# Patient Record
Sex: MALE | Race: ASIAN | NOT HISPANIC OR LATINO | Employment: OTHER | URBAN - METROPOLITAN AREA
[De-identification: names, ages, dates, MRNs, and addresses within clinical notes are randomized per-mention and may not be internally consistent; named-entity substitution may affect disease eponyms.]

---

## 2017-01-01 ENCOUNTER — HOSPITAL ENCOUNTER (EMERGENCY)
Facility: HOSPITAL | Age: 82
Discharge: HOME | End: 2017-08-02
Attending: EMERGENCY MEDICINE
Payer: MEDICARE

## 2017-01-01 ENCOUNTER — HOSPITAL ENCOUNTER (INPATIENT)
Facility: HOSPITAL | Age: 82
LOS: 3 days | DRG: 871 | End: 2017-09-09
Attending: EMERGENCY MEDICINE | Admitting: FAMILY MEDICINE
Payer: MEDICARE

## 2017-01-01 VITALS
OXYGEN SATURATION: 100 % | BODY MASS INDEX: 17.99 KG/M2 | HEART RATE: 80 BPM | RESPIRATION RATE: 16 BRPM | WEIGHT: 108 LBS | DIASTOLIC BLOOD PRESSURE: 86 MMHG | TEMPERATURE: 98 F | SYSTOLIC BLOOD PRESSURE: 139 MMHG | HEIGHT: 65 IN

## 2017-01-01 VITALS
WEIGHT: 95.88 LBS | SYSTOLIC BLOOD PRESSURE: 41 MMHG | BODY MASS INDEX: 18.82 KG/M2 | HEIGHT: 60 IN | DIASTOLIC BLOOD PRESSURE: 25 MMHG | RESPIRATION RATE: 41 BRPM | TEMPERATURE: 98 F | OXYGEN SATURATION: 76 %

## 2017-01-01 DIAGNOSIS — Z86.79 HX OF ACUTE BACTERIAL ENDOCARDITIS: ICD-10-CM

## 2017-01-01 DIAGNOSIS — A41.9 SEPSIS DUE TO PNEUMONIA: ICD-10-CM

## 2017-01-01 DIAGNOSIS — A41.9 SEPSIS, DUE TO UNSPECIFIED ORGANISM: ICD-10-CM

## 2017-01-01 DIAGNOSIS — N39.0 URINARY TRACT INFECTION WITH HEMATURIA, SITE UNSPECIFIED: ICD-10-CM

## 2017-01-01 DIAGNOSIS — S00.83XA FOREHEAD CONTUSION, INITIAL ENCOUNTER: Primary | ICD-10-CM

## 2017-01-01 DIAGNOSIS — E87.20 METABOLIC ACIDOSIS: ICD-10-CM

## 2017-01-01 DIAGNOSIS — J18.9 PNEUMONIA OF RIGHT UPPER LOBE DUE TO INFECTIOUS ORGANISM: Primary | ICD-10-CM

## 2017-01-01 DIAGNOSIS — J18.9 SEPSIS DUE TO PNEUMONIA: ICD-10-CM

## 2017-01-01 DIAGNOSIS — R62.7 FAILURE TO THRIVE IN ADULT: ICD-10-CM

## 2017-01-01 DIAGNOSIS — E16.2 HYPOGLYCEMIA: ICD-10-CM

## 2017-01-01 DIAGNOSIS — N17.0 ACUTE RENAL FAILURE WITH TUBULAR NECROSIS: ICD-10-CM
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
## 2017-01-01 DIAGNOSIS — R31.9 URINARY TRACT INFECTION WITH HEMATURIA, SITE UNSPECIFIED: ICD-10-CM

## 2017-01-01 DIAGNOSIS — I35.9 NONRHEUMATIC AORTIC VALVE DISORDER: ICD-10-CM

## 2017-01-01 LAB
ALBUMIN SERPL BCP-MCNC: 1.7 G/DL
ALBUMIN SERPL BCP-MCNC: 2.2 G/DL
ALBUMIN SERPL BCP-MCNC: 2.8 G/DL
ALLENS TEST: ABNORMAL
ALP SERPL-CCNC: 55 U/L
ALP SERPL-CCNC: 60 U/L
ALP SERPL-CCNC: 98 U/L
ALT SERPL W/O P-5'-P-CCNC: 38 U/L
ALT SERPL W/O P-5'-P-CCNC: 49 U/L
ALT SERPL W/O P-5'-P-CCNC: 74 U/L
ANION GAP SERPL CALC-SCNC: 13 MMOL/L
ANION GAP SERPL CALC-SCNC: 8 MMOL/L
ANION GAP SERPL CALC-SCNC: 9 MMOL/L
ANISOCYTOSIS BLD QL SMEAR: SLIGHT
ANISOCYTOSIS BLD QL SMEAR: SLIGHT
AORTIC VALVE REGURGITATION: ABNORMAL
APTT BLDCRRT: 27.9 SEC
AST SERPL-CCNC: 77 U/L
AST SERPL-CCNC: 90 U/L
AST SERPL-CCNC: 91 U/L
BACTERIA #/AREA URNS HPF: ABNORMAL /HPF
BACTERIA BLD CULT: NORMAL
BACTERIA SPEC AEROBE CULT: NORMAL
BACTERIA UR CULT: NO GROWTH
BACTERIA UR CULT: NO GROWTH
BASOPHILS # BLD AUTO: 0 K/UL
BASOPHILS # BLD AUTO: ABNORMAL K/UL
BASOPHILS # BLD AUTO: ABNORMAL K/UL
BASOPHILS NFR BLD: 0 %
BILIRUB SERPL-MCNC: 0.4 MG/DL
BILIRUB SERPL-MCNC: 0.5 MG/DL
BILIRUB SERPL-MCNC: 0.5 MG/DL
BILIRUB UR QL STRIP: NEGATIVE
BILIRUB UR QL STRIP: NEGATIVE
BNP SERPL-MCNC: 145 PG/ML
BUN SERPL-MCNC: 42 MG/DL
BUN SERPL-MCNC: 50 MG/DL
BUN SERPL-MCNC: 51 MG/DL
BURR CELLS BLD QL SMEAR: ABNORMAL
BURR CELLS BLD QL SMEAR: ABNORMAL
CALCIUM SERPL-MCNC: 7.4 MG/DL
CALCIUM SERPL-MCNC: 7.5 MG/DL
CALCIUM SERPL-MCNC: 8.6 MG/DL
CHLORIDE SERPL-SCNC: 106 MMOL/L
CHLORIDE SERPL-SCNC: 114 MMOL/L
CHLORIDE SERPL-SCNC: 114 MMOL/L
CLARITY UR: CLEAR
CLARITY UR: CLEAR
CO2 SERPL-SCNC: 13 MMOL/L
CO2 SERPL-SCNC: 16 MMOL/L
CO2 SERPL-SCNC: 17 MMOL/L
COLOR UR: ABNORMAL
COLOR UR: YELLOW
CORTIS SERPL-MCNC: 32.7 UG/DL
CREAT SERPL-MCNC: 1 MG/DL
CREAT SERPL-MCNC: 1.1 MG/DL
CREAT SERPL-MCNC: 1.5 MG/DL
DACRYOCYTES BLD QL SMEAR: ABNORMAL
DELSYS: ABNORMAL
DIASTOLIC DYSFUNCTION: YES
DIFFERENTIAL METHOD: ABNORMAL
EOSINOPHIL # BLD AUTO: 0 K/UL
EOSINOPHIL # BLD AUTO: ABNORMAL K/UL
EOSINOPHIL # BLD AUTO: ABNORMAL K/UL
EOSINOPHIL NFR BLD: 0 %
EOSINOPHIL NFR BLD: 0 %
EOSINOPHIL NFR BLD: 0.6 %
EOSINOPHIL NFR BLD: 1 %
EP: 5
ERYTHROCYTE [DISTWIDTH] IN BLOOD BY AUTOMATED COUNT: 13.8 %
ERYTHROCYTE [DISTWIDTH] IN BLOOD BY AUTOMATED COUNT: 14 %
ERYTHROCYTE [DISTWIDTH] IN BLOOD BY AUTOMATED COUNT: 14.1 %
ERYTHROCYTE [DISTWIDTH] IN BLOOD BY AUTOMATED COUNT: 14.2 %
ERYTHROCYTE [SEDIMENTATION RATE] IN BLOOD BY WESTERGREN METHOD: 24 MM/H
ERYTHROCYTE [SEDIMENTATION RATE] IN BLOOD BY WESTERGREN METHOD: 30 MM/H
ERYTHROCYTE [SEDIMENTATION RATE] IN BLOOD BY WESTERGREN METHOD: 4 MM/H
EST. GFR  (AFRICAN AMERICAN): 49 ML/MIN/1.73 M^2
EST. GFR  (AFRICAN AMERICAN): >60 ML/MIN/1.73 M^2
EST. GFR  (AFRICAN AMERICAN): >60 ML/MIN/1.73 M^2
EST. GFR  (NON AFRICAN AMERICAN): 43 ML/MIN/1.73 M^2
EST. GFR  (NON AFRICAN AMERICAN): >60 ML/MIN/1.73 M^2
EST. GFR  (NON AFRICAN AMERICAN): >60 ML/MIN/1.73 M^2
ESTIMATED PA SYSTOLIC PRESSURE: 21.49
FIO2: 100
FIO2: 60
FIO2: 80
FLUAV AG SPEC QL IA: NEGATIVE
FLUBV AG SPEC QL IA: NEGATIVE
GGT SERPL-CCNC: 29 U/L
GLOBAL PERICARDIAL EFFUSION: ABNORMAL
GLUCOSE SERPL-MCNC: 109 MG/DL (ref 70–110)
GLUCOSE SERPL-MCNC: 181 MG/DL
GLUCOSE SERPL-MCNC: 57 MG/DL
GLUCOSE SERPL-MCNC: 64 MG/DL
GLUCOSE UR QL STRIP: NEGATIVE
GLUCOSE UR QL STRIP: NEGATIVE
GRAM STN SPEC: NORMAL
HCO3 UR-SCNC: 13.2 MMOL/L (ref 24–28)
HCO3 UR-SCNC: 13.7 MMOL/L (ref 24–28)
HCO3 UR-SCNC: 14.1 MMOL/L (ref 24–28)
HCO3 UR-SCNC: 15.2 MMOL/L (ref 24–28)
HCO3 UR-SCNC: 18.8 MMOL/L (ref 24–28)
HCT VFR BLD AUTO: 27.9 %
HCT VFR BLD AUTO: 31.1 %
HCT VFR BLD AUTO: 31.5 %
HCT VFR BLD AUTO: 33.7 %
HGB BLD-MCNC: 10.2 G/DL
HGB BLD-MCNC: 10.3 G/DL
HGB BLD-MCNC: 11 G/DL
HGB BLD-MCNC: 9.5 G/DL
HGB UR QL STRIP: ABNORMAL
HGB UR QL STRIP: NEGATIVE
HYALINE CASTS #/AREA URNS LPF: 0 /LPF
HYPOCHROMIA BLD QL SMEAR: ABNORMAL
HYPOCHROMIA BLD QL SMEAR: ABNORMAL
INR PPP: 1
IP: 10
KETONES UR QL STRIP: ABNORMAL
KETONES UR QL STRIP: NEGATIVE
LACTATE SERPL-SCNC: 2.8 MMOL/L
LACTATE SERPL-SCNC: 2.8 MMOL/L
LACTATE SERPL-SCNC: 3.2 MMOL/L
LACTATE SERPL-SCNC: 3.3 MMOL/L
LACTATE SERPL-SCNC: 3.3 MMOL/L
LACTATE SERPL-SCNC: 3.5 MMOL/L
LACTATE SERPL-SCNC: 3.6 MMOL/L
LACTATE SERPL-SCNC: 3.7 MMOL/L
LACTATE SERPL-SCNC: 3.8 MMOL/L
LACTATE SERPL-SCNC: 4.1 MMOL/L
LACTATE SERPL-SCNC: 4.4 MMOL/L
LACTATE SERPL-SCNC: 4.5 MMOL/L
LACTATE SERPL-SCNC: 4.6 MMOL/L
LACTATE SERPL-SCNC: 4.7 MMOL/L
LEUKOCYTE ESTERASE UR QL STRIP: ABNORMAL
LEUKOCYTE ESTERASE UR QL STRIP: NEGATIVE
LIPASE SERPL-CCNC: 120 U/L
LYMPHOCYTES # BLD AUTO: 0.7 K/UL
LYMPHOCYTES # BLD AUTO: ABNORMAL K/UL
LYMPHOCYTES # BLD AUTO: ABNORMAL K/UL
LYMPHOCYTES NFR BLD: 3 %
LYMPHOCYTES NFR BLD: 3 %
LYMPHOCYTES NFR BLD: 40.4 %
LYMPHOCYTES NFR BLD: 8 %
MAGNESIUM SERPL-MCNC: 1.5 MG/DL
MAGNESIUM SERPL-MCNC: 1.9 MG/DL
MAGNESIUM SERPL-MCNC: 2 MG/DL
MCH RBC QN AUTO: 29.9 PG
MCH RBC QN AUTO: 29.9 PG
MCH RBC QN AUTO: 30 PG
MCH RBC QN AUTO: 31 PG
MCHC RBC AUTO-ENTMCNC: 32.6 G/DL
MCHC RBC AUTO-ENTMCNC: 32.7 G/DL
MCHC RBC AUTO-ENTMCNC: 32.8 G/DL
MCHC RBC AUTO-ENTMCNC: 34.1 G/DL
MCV RBC AUTO: 91 FL
MCV RBC AUTO: 91 FL
MCV RBC AUTO: 92 FL
MCV RBC AUTO: 92 FL
METAMYELOCYTES NFR BLD MANUAL: 12 %
METAMYELOCYTES NFR BLD MANUAL: 7 %
MICROSCOPIC COMMENT: ABNORMAL
MIN VOL: 10.6
MIN VOL: 15
MIN VOL: 30
MODE: ABNORMAL
MONOCYTES # BLD AUTO: 0 K/UL
MONOCYTES # BLD AUTO: ABNORMAL K/UL
MONOCYTES # BLD AUTO: ABNORMAL K/UL
MONOCYTES NFR BLD: 0 %
MONOCYTES NFR BLD: 1.2 %
MONOCYTES NFR BLD: 2 %
MONOCYTES NFR BLD: 2 %
MYELOCYTES NFR BLD MANUAL: 2 %
NEUTROPHILS # BLD AUTO: 0.9 K/UL
NEUTROPHILS NFR BLD: 18 %
NEUTROPHILS NFR BLD: 43 %
NEUTROPHILS NFR BLD: 57.8 %
NEUTROPHILS NFR BLD: 69 %
NEUTS BAND NFR BLD MANUAL: 20 %
NEUTS BAND NFR BLD MANUAL: 47 %
NEUTS BAND NFR BLD MANUAL: 63 %
NITRITE UR QL STRIP: NEGATIVE
NITRITE UR QL STRIP: POSITIVE
PATH REV BLD -IMP: NORMAL
PCO2 BLDA: 23.3 MMHG (ref 35–45)
PCO2 BLDA: 29.5 MMHG (ref 35–45)
PCO2 BLDA: 30.7 MMHG (ref 35–45)
PCO2 BLDA: 37.6 MMHG (ref 35–45)
PCO2 BLDA: 45.7 MMHG (ref 35–45)
PEEP: 14
PEEP: 8
PH SMN: 7.07 [PH] (ref 7.35–7.45)
PH SMN: 7.21 [PH] (ref 7.35–7.45)
PH SMN: 7.26 [PH] (ref 7.35–7.45)
PH SMN: 7.39 [PH] (ref 7.35–7.45)
PH SMN: 7.41 [PH] (ref 7.35–7.45)
PH UR STRIP: 6 [PH] (ref 5–8)
PH UR STRIP: 6 [PH] (ref 5–8)
PHOSPHATE SERPL-MCNC: 2.7 MG/DL
PHOSPHATE SERPL-MCNC: 2.9 MG/DL
PHOSPHATE SERPL-MCNC: 2.9 MG/DL
PIP: 25
PIP: 34
PLATELET # BLD AUTO: 126 K/UL
PLATELET # BLD AUTO: 155 K/UL
PLATELET # BLD AUTO: 169 K/UL
PLATELET # BLD AUTO: 267 K/UL
PLATELET BLD QL SMEAR: ABNORMAL
PMV BLD AUTO: 10.1 FL
PMV BLD AUTO: 9.1 FL
PMV BLD AUTO: 9.2 FL
PMV BLD AUTO: 9.3 FL
PO2 BLDA: 151 MMHG (ref 80–100)
PO2 BLDA: 20 MMHG (ref 40–60)
PO2 BLDA: 284 MMHG (ref 80–100)
PO2 BLDA: 56 MMHG (ref 80–100)
PO2 BLDA: 56 MMHG (ref 80–100)
POC BE: -11 MMOL/L
POC BE: -13 MMOL/L
POC BE: -13 MMOL/L
POC BE: -17 MMOL/L
POC BE: -6 MMOL/L
POC SATURATED O2: 100 % (ref 95–100)
POC SATURATED O2: 24 % (ref 95–100)
POC SATURATED O2: 76 % (ref 95–100)
POC SATURATED O2: 84 % (ref 95–100)
POC SATURATED O2: 99 % (ref 95–100)
POC TCO2: 15 MMOL/L (ref 23–27)
POC TCO2: 16 MMOL/L (ref 24–29)
POC TCO2: 20 MMOL/L (ref 23–27)
POCT GLUCOSE: 128 MG/DL (ref 70–110)
POCT GLUCOSE: 146 MG/DL (ref 70–110)
POCT GLUCOSE: 147 MG/DL (ref 70–110)
POCT GLUCOSE: 47 MG/DL (ref 70–110)
POCT GLUCOSE: 49 MG/DL (ref 70–110)
POCT GLUCOSE: 59 MG/DL (ref 70–110)
POCT GLUCOSE: 62 MG/DL (ref 70–110)
POCT GLUCOSE: 63 MG/DL (ref 70–110)
POCT GLUCOSE: 64 MG/DL (ref 70–110)
POCT GLUCOSE: 67 MG/DL (ref 70–110)
POCT GLUCOSE: 69 MG/DL (ref 70–110)
POCT GLUCOSE: 74 MG/DL (ref 70–110)
POCT GLUCOSE: 84 MG/DL (ref 70–110)
POCT GLUCOSE: 85 MG/DL (ref 70–110)
POCT GLUCOSE: 85 MG/DL (ref 70–110)
POCT GLUCOSE: 92 MG/DL (ref 70–110)
POCT GLUCOSE: 99 MG/DL (ref 70–110)
POIKILOCYTOSIS BLD QL SMEAR: SLIGHT
POIKILOCYTOSIS BLD QL SMEAR: SLIGHT
POTASSIUM SERPL-SCNC: 4.2 MMOL/L
POTASSIUM SERPL-SCNC: 4.5 MMOL/L
POTASSIUM SERPL-SCNC: 4.5 MMOL/L
PROCALCITONIN SERPL IA-MCNC: 0.38 NG/ML
PROCALCITONIN SERPL IA-MCNC: 39.29 NG/ML
PROT SERPL-MCNC: 4.6 G/DL
PROT SERPL-MCNC: 5.9 G/DL
PROT SERPL-MCNC: 7.3 G/DL
PROT UR QL STRIP: ABNORMAL
PROT UR QL STRIP: ABNORMAL
PROTHROMBIN TIME: 11 SEC
RBC # BLD AUTO: 3.06 M/UL
RBC # BLD AUTO: 3.4 M/UL
RBC # BLD AUTO: 3.45 M/UL
RBC # BLD AUTO: 3.68 M/UL
RBC #/AREA URNS HPF: >100 /HPF (ref 0–4)
RETIRED EF AND QEF - SEE NOTES: 60 (ref 55–65)
SAMPLE: ABNORMAL
SITE: ABNORMAL
SODIUM SERPL-SCNC: 135 MMOL/L
SODIUM SERPL-SCNC: 136 MMOL/L
SODIUM SERPL-SCNC: 139 MMOL/L
SP GR UR STRIP: 1.01 (ref 1–1.03)
SP GR UR STRIP: 1.02 (ref 1–1.03)
SP02: 76
SP02: 92
SP02: 95
SP02: 97
SPECIMEN SOURCE: NORMAL
SPONT RATE: 33
SPONT RATE: 34
SQUAMOUS #/AREA URNS HPF: 2 /HPF
T4 FREE SERPL-MCNC: 1.14 NG/DL
TRICUSPID VALVE REGURGITATION: ABNORMAL
TROPONIN I SERPL DL<=0.01 NG/ML-MCNC: 0.01 NG/ML
TSH SERPL DL<=0.005 MIU/L-ACNC: 3.1 UIU/ML
TSH SERPL DL<=0.005 MIU/L-ACNC: 4.22 UIU/ML
URN SPEC COLLECT METH UR: ABNORMAL
URN SPEC COLLECT METH UR: ABNORMAL
UROBILINOGEN UR STRIP-ACNC: 1 EU/DL
UROBILINOGEN UR STRIP-ACNC: 1 EU/DL
VANCOMYCIN TROUGH SERPL-MCNC: 11.4 UG/ML
VT: 330
VT: 330
WBC # BLD AUTO: 1.61 K/UL
WBC # BLD AUTO: 11.43 K/UL
WBC # BLD AUTO: 16.31 K/UL
WBC # BLD AUTO: 16.69 K/UL
WBC #/AREA URNS HPF: 2 /HPF (ref 0–5)
WBC CLUMPS URNS QL MICRO: ABNORMAL
YEAST URNS QL MICRO: ABNORMAL

## 2017-01-01 PROCEDURE — 87040 BLOOD CULTURE FOR BACTERIA: CPT | Mod: 59

## 2017-01-01 PROCEDURE — 85007 BL SMEAR W/DIFF WBC COUNT: CPT

## 2017-01-01 PROCEDURE — 63600175 PHARM REV CODE 636 W HCPCS: Performed by: INTERNAL MEDICINE

## 2017-01-01 PROCEDURE — 25000003 PHARM REV CODE 250: Performed by: STUDENT IN AN ORGANIZED HEALTH CARE EDUCATION/TRAINING PROGRAM

## 2017-01-01 PROCEDURE — 83605 ASSAY OF LACTIC ACID: CPT | Mod: 91

## 2017-01-01 PROCEDURE — 84443 ASSAY THYROID STIM HORMONE: CPT

## 2017-01-01 PROCEDURE — 84100 ASSAY OF PHOSPHORUS: CPT

## 2017-01-01 PROCEDURE — 94660 CPAP INITIATION&MGMT: CPT

## 2017-01-01 PROCEDURE — 20000000 HC ICU ROOM

## 2017-01-01 PROCEDURE — 63600175 PHARM REV CODE 636 W HCPCS: Performed by: STUDENT IN AN ORGANIZED HEALTH CARE EDUCATION/TRAINING PROGRAM

## 2017-01-01 PROCEDURE — 85027 COMPLETE CBC AUTOMATED: CPT

## 2017-01-01 PROCEDURE — 36415 COLL VENOUS BLD VENIPUNCTURE: CPT

## 2017-01-01 PROCEDURE — 0BH18EZ INSERTION OF ENDOTRACHEAL AIRWAY INTO TRACHEA, VIA NATURAL OR ARTIFICIAL OPENING ENDOSCOPIC: ICD-10-PCS | Performed by: INTERNAL MEDICINE

## 2017-01-01 PROCEDURE — 94761 N-INVAS EAR/PLS OXIMETRY MLT: CPT

## 2017-01-01 PROCEDURE — 99291 CRITICAL CARE FIRST HOUR: CPT | Mod: 25,GC,, | Performed by: INTERNAL MEDICINE

## 2017-01-01 PROCEDURE — 99285 EMERGENCY DEPT VISIT HI MDM: CPT | Mod: 25

## 2017-01-01 PROCEDURE — 25000003 PHARM REV CODE 250

## 2017-01-01 PROCEDURE — S0028 INJECTION, FAMOTIDINE, 20 MG: HCPCS | Performed by: STUDENT IN AN ORGANIZED HEALTH CARE EDUCATION/TRAINING PROGRAM

## 2017-01-01 PROCEDURE — 96367 TX/PROPH/DG ADDL SEQ IV INF: CPT

## 2017-01-01 PROCEDURE — 80053 COMPREHEN METABOLIC PANEL: CPT

## 2017-01-01 PROCEDURE — 63600175 PHARM REV CODE 636 W HCPCS: Performed by: FAMILY MEDICINE

## 2017-01-01 PROCEDURE — 84484 ASSAY OF TROPONIN QUANT: CPT

## 2017-01-01 PROCEDURE — 83690 ASSAY OF LIPASE: CPT

## 2017-01-01 PROCEDURE — 82533 TOTAL CORTISOL: CPT

## 2017-01-01 PROCEDURE — 36600 WITHDRAWAL OF ARTERIAL BLOOD: CPT

## 2017-01-01 PROCEDURE — 85060 BLOOD SMEAR INTERPRETATION: CPT | Mod: ,,, | Performed by: PATHOLOGY

## 2017-01-01 PROCEDURE — 99285 EMERGENCY DEPT VISIT HI MDM: CPT

## 2017-01-01 PROCEDURE — 81003 URINALYSIS AUTO W/O SCOPE: CPT

## 2017-01-01 PROCEDURE — 93005 ELECTROCARDIOGRAM TRACING: CPT

## 2017-01-01 PROCEDURE — 25000003 PHARM REV CODE 250: Performed by: EMERGENCY MEDICINE

## 2017-01-01 PROCEDURE — 84145 PROCALCITONIN (PCT): CPT

## 2017-01-01 PROCEDURE — 31500 INSERT EMERGENCY AIRWAY: CPT | Mod: GC,,, | Performed by: INTERNAL MEDICINE

## 2017-01-01 PROCEDURE — 82977 ASSAY OF GGT: CPT

## 2017-01-01 PROCEDURE — 27000190 HC CPAP FULL FACE MASK W/VALVE

## 2017-01-01 PROCEDURE — 84439 ASSAY OF FREE THYROXINE: CPT

## 2017-01-01 PROCEDURE — 82803 BLOOD GASES ANY COMBINATION: CPT

## 2017-01-01 PROCEDURE — 81000 URINALYSIS NONAUTO W/SCOPE: CPT

## 2017-01-01 PROCEDURE — 25000003 PHARM REV CODE 250: Performed by: FAMILY MEDICINE

## 2017-01-01 PROCEDURE — 63600175 PHARM REV CODE 636 W HCPCS: Performed by: EMERGENCY MEDICINE

## 2017-01-01 PROCEDURE — 87186 SC STD MICRODIL/AGAR DIL: CPT

## 2017-01-01 PROCEDURE — 27000221 HC OXYGEN, UP TO 24 HOURS

## 2017-01-01 PROCEDURE — 94002 VENT MGMT INPAT INIT DAY: CPT

## 2017-01-01 PROCEDURE — C9113 INJ PANTOPRAZOLE SODIUM, VIA: HCPCS | Performed by: STUDENT IN AN ORGANIZED HEALTH CARE EDUCATION/TRAINING PROGRAM

## 2017-01-01 PROCEDURE — 99291 CRITICAL CARE FIRST HOUR: CPT | Mod: GC,,, | Performed by: INTERNAL MEDICINE

## 2017-01-01 PROCEDURE — 63600175 PHARM REV CODE 636 W HCPCS

## 2017-01-01 PROCEDURE — 94003 VENT MGMT INPAT SUBQ DAY: CPT

## 2017-01-01 PROCEDURE — 83880 ASSAY OF NATRIURETIC PEPTIDE: CPT

## 2017-01-01 PROCEDURE — 83605 ASSAY OF LACTIC ACID: CPT

## 2017-01-01 PROCEDURE — 97802 MEDICAL NUTRITION INDIV IN: CPT

## 2017-01-01 PROCEDURE — 25000003 PHARM REV CODE 250: Performed by: INTERNAL MEDICINE

## 2017-01-01 PROCEDURE — 96361 HYDRATE IV INFUSION ADD-ON: CPT

## 2017-01-01 PROCEDURE — 96365 THER/PROPH/DIAG IV INF INIT: CPT

## 2017-01-01 PROCEDURE — 97803 MED NUTRITION INDIV SUBSEQ: CPT

## 2017-01-01 PROCEDURE — 96366 THER/PROPH/DIAG IV INF ADDON: CPT

## 2017-01-01 PROCEDURE — 99900035 HC TECH TIME PER 15 MIN (STAT)

## 2017-01-01 PROCEDURE — 99222 1ST HOSP IP/OBS MODERATE 55: CPT | Mod: GC,,, | Performed by: INTERNAL MEDICINE

## 2017-01-01 PROCEDURE — 83735 ASSAY OF MAGNESIUM: CPT

## 2017-01-01 PROCEDURE — 5A1945Z RESPIRATORY VENTILATION, 24-96 CONSECUTIVE HOURS: ICD-10-PCS | Performed by: INTERNAL MEDICINE

## 2017-01-01 PROCEDURE — 87400 INFLUENZA A/B EACH AG IA: CPT | Mod: 59

## 2017-01-01 PROCEDURE — 87086 URINE CULTURE/COLONY COUNT: CPT

## 2017-01-01 PROCEDURE — 85730 THROMBOPLASTIN TIME PARTIAL: CPT

## 2017-01-01 PROCEDURE — 31720 CLEARANCE OF AIRWAYS: CPT

## 2017-01-01 PROCEDURE — 93306 TTE W/DOPPLER COMPLETE: CPT

## 2017-01-01 PROCEDURE — 87070 CULTURE OTHR SPECIMN AEROBIC: CPT

## 2017-01-01 PROCEDURE — 84443 ASSAY THYROID STIM HORMONE: CPT | Mod: 91

## 2017-01-01 PROCEDURE — 93306 TTE W/DOPPLER COMPLETE: CPT | Mod: 26,,, | Performed by: INTERNAL MEDICINE

## 2017-01-01 PROCEDURE — 93010 ELECTROCARDIOGRAM REPORT: CPT | Mod: ,,, | Performed by: INTERNAL MEDICINE

## 2017-01-01 PROCEDURE — 85610 PROTHROMBIN TIME: CPT

## 2017-01-01 PROCEDURE — 87449 NOS EACH ORGANISM AG IA: CPT

## 2017-01-01 PROCEDURE — S5010 5% DEXTROSE AND 0.45% SALINE: HCPCS

## 2017-01-01 PROCEDURE — 87205 SMEAR GRAM STAIN: CPT

## 2017-01-01 PROCEDURE — 87077 CULTURE AEROBIC IDENTIFY: CPT

## 2017-01-01 PROCEDURE — 80202 ASSAY OF VANCOMYCIN: CPT

## 2017-01-01 RX ORDER — FAMOTIDINE 10 MG/ML
20 INJECTION INTRAVENOUS 2 TIMES DAILY
Status: DISCONTINUED | OUTPATIENT
Start: 2017-01-01 | End: 2017-01-01

## 2017-01-01 RX ORDER — FUROSEMIDE 10 MG/ML
40 INJECTION INTRAMUSCULAR; INTRAVENOUS ONCE
Status: COMPLETED | OUTPATIENT
Start: 2017-01-01 | End: 2017-01-01

## 2017-01-01 RX ORDER — ONDANSETRON 8 MG/1
8 TABLET, ORALLY DISINTEGRATING ORAL EVERY 8 HOURS PRN
Status: DISCONTINUED | OUTPATIENT
Start: 2017-01-01 | End: 2017-01-01 | Stop reason: HOSPADM

## 2017-01-01 RX ORDER — MORPHINE SULFATE 2 MG/ML
0.5 INJECTION, SOLUTION INTRAMUSCULAR; INTRAVENOUS ONCE
Status: COMPLETED | OUTPATIENT
Start: 2017-01-01 | End: 2017-01-01

## 2017-01-01 RX ORDER — PHENYLEPHRINE HYDROCHLORIDE 10 MG/ML
INJECTION INTRAVENOUS
Status: DISPENSED
Start: 2017-01-01 | End: 2017-01-01

## 2017-01-01 RX ORDER — CEFEPIME HYDROCHLORIDE 1 G/50ML
1 INJECTION, SOLUTION INTRAVENOUS
Status: DISCONTINUED | OUTPATIENT
Start: 2017-01-01 | End: 2017-01-01

## 2017-01-01 RX ORDER — SODIUM CHLORIDE 9 MG/ML
INJECTION, SOLUTION INTRAVENOUS CONTINUOUS
Status: DISCONTINUED | OUTPATIENT
Start: 2017-01-01 | End: 2017-01-01

## 2017-01-01 RX ORDER — LOSARTAN POTASSIUM 50 MG/1
50 TABLET ORAL DAILY
Status: DISCONTINUED | OUTPATIENT
Start: 2017-01-01 | End: 2017-01-01

## 2017-01-01 RX ORDER — CIPROFLOXACIN 2 MG/ML
400 INJECTION, SOLUTION INTRAVENOUS
Status: DISCONTINUED | OUTPATIENT
Start: 2017-01-01 | End: 2017-01-01

## 2017-01-01 RX ORDER — INSULIN ASPART 100 [IU]/ML
0-5 INJECTION, SOLUTION INTRAVENOUS; SUBCUTANEOUS EVERY 6 HOURS PRN
Status: DISCONTINUED | OUTPATIENT
Start: 2017-01-01 | End: 2017-01-01 | Stop reason: HOSPADM

## 2017-01-01 RX ORDER — SUCCINYLCHOLINE CHLORIDE 20 MG/ML
60 INJECTION INTRAMUSCULAR; INTRAVENOUS ONCE
Status: COMPLETED | OUTPATIENT
Start: 2017-01-01 | End: 2017-01-01

## 2017-01-01 RX ORDER — CEFEPIME HYDROCHLORIDE 2 G/50ML
2 INJECTION, SOLUTION INTRAVENOUS
Status: DISCONTINUED | OUTPATIENT
Start: 2017-01-01 | End: 2017-01-01

## 2017-01-01 RX ORDER — NAPROXEN SODIUM 220 MG/1
81 TABLET, FILM COATED ORAL DAILY
Status: DISCONTINUED | OUTPATIENT
Start: 2017-01-01 | End: 2017-01-01

## 2017-01-01 RX ORDER — BISACODYL 10 MG
10 SUPPOSITORY, RECTAL RECTAL ONCE
Status: COMPLETED | OUTPATIENT
Start: 2017-01-01 | End: 2017-01-01

## 2017-01-01 RX ORDER — DEXTROSE MONOHYDRATE, SODIUM CHLORIDE, AND POTASSIUM CHLORIDE 50; .745; 4.5 G/1000ML; G/1000ML; G/1000ML
INJECTION, SOLUTION INTRAVENOUS CONTINUOUS
Status: DISCONTINUED | OUTPATIENT
Start: 2017-01-01 | End: 2017-01-01

## 2017-01-01 RX ORDER — FENTANYL CITRATE 50 UG/ML
50 INJECTION, SOLUTION INTRAMUSCULAR; INTRAVENOUS ONCE
Status: DISCONTINUED | OUTPATIENT
Start: 2017-01-01 | End: 2017-01-01 | Stop reason: HOSPADM

## 2017-01-01 RX ORDER — ENOXAPARIN SODIUM 100 MG/ML
40 INJECTION SUBCUTANEOUS EVERY 24 HOURS
Status: DISCONTINUED | OUTPATIENT
Start: 2017-01-01 | End: 2017-01-01

## 2017-01-01 RX ORDER — CYANOCOBALAMIN (VITAMIN B-12) 250 MCG
500 TABLET ORAL DAILY
Status: DISCONTINUED | OUTPATIENT
Start: 2017-01-01 | End: 2017-01-01

## 2017-01-01 RX ORDER — GLUCAGON 1 MG
1 KIT INJECTION
Status: DISCONTINUED | OUTPATIENT
Start: 2017-01-01 | End: 2017-01-01

## 2017-01-01 RX ORDER — PROPOFOL 10 MG/ML
15 VIAL (ML) INTRAVENOUS ONCE
Status: COMPLETED | OUTPATIENT
Start: 2017-01-01 | End: 2017-01-01

## 2017-01-01 RX ORDER — CLOPIDOGREL BISULFATE 75 MG/1
75 TABLET ORAL DAILY
Status: DISCONTINUED | OUTPATIENT
Start: 2017-01-01 | End: 2017-01-01

## 2017-01-01 RX ORDER — ACETAMINOPHEN 650 MG/1
650 SUPPOSITORY RECTAL ONCE
Status: COMPLETED | OUTPATIENT
Start: 2017-01-01 | End: 2017-01-01

## 2017-01-01 RX ORDER — CEFEPIME HYDROCHLORIDE 2 G/50ML
2 INJECTION, SOLUTION INTRAVENOUS
Status: COMPLETED | OUTPATIENT
Start: 2017-01-01 | End: 2017-01-01

## 2017-01-01 RX ORDER — MORPHINE SULFATE 2 MG/ML
1 INJECTION, SOLUTION INTRAMUSCULAR; INTRAVENOUS ONCE
Status: COMPLETED | OUTPATIENT
Start: 2017-01-01 | End: 2017-01-01

## 2017-01-01 RX ORDER — CHLORHEXIDINE GLUCONATE ORAL RINSE 1.2 MG/ML
15 SOLUTION DENTAL 2 TIMES DAILY
Status: DISCONTINUED | OUTPATIENT
Start: 2017-01-01 | End: 2017-01-01 | Stop reason: HOSPADM

## 2017-01-01 RX ORDER — DEXTROSE MONOHYDRATE AND SODIUM CHLORIDE 5; .45 G/100ML; G/100ML
INJECTION, SOLUTION INTRAVENOUS CONTINUOUS
Status: DISCONTINUED | OUTPATIENT
Start: 2017-01-01 | End: 2017-01-01

## 2017-01-01 RX ORDER — MAGNESIUM SULFATE HEPTAHYDRATE 40 MG/ML
2 INJECTION, SOLUTION INTRAVENOUS ONCE
Status: COMPLETED | OUTPATIENT
Start: 2017-01-01 | End: 2017-01-01

## 2017-01-01 RX ORDER — PANTOPRAZOLE SODIUM 40 MG/10ML
40 INJECTION, POWDER, LYOPHILIZED, FOR SOLUTION INTRAVENOUS DAILY
Status: DISCONTINUED | OUTPATIENT
Start: 2017-01-01 | End: 2017-01-01 | Stop reason: HOSPADM

## 2017-01-01 RX ORDER — TAMSULOSIN HYDROCHLORIDE 0.4 MG/1
0.4 CAPSULE ORAL DAILY
Status: DISCONTINUED | OUTPATIENT
Start: 2017-01-01 | End: 2017-01-01

## 2017-01-01 RX ORDER — ETOMIDATE 2 MG/ML
12 INJECTION INTRAVENOUS ONCE
Status: COMPLETED | OUTPATIENT
Start: 2017-01-01 | End: 2017-01-01

## 2017-01-01 RX ADMIN — FUROSEMIDE 40 MG: 10 INJECTION, SOLUTION INTRAMUSCULAR; INTRAVENOUS at 03:09

## 2017-01-01 RX ADMIN — CIPROFLOXACIN 400 MG: 2 INJECTION, SOLUTION INTRAVENOUS at 04:09

## 2017-01-01 RX ADMIN — DEXTROSE MONOHYDRATE 12.5 G: 25 INJECTION, SOLUTION INTRAVENOUS at 12:09

## 2017-01-01 RX ADMIN — MORPHINE SULFATE 1 MG: 2 INJECTION, SOLUTION INTRAMUSCULAR; INTRAVENOUS at 08:09

## 2017-01-01 RX ADMIN — MORPHINE SULFATE 0.5 MG: 2 INJECTION, SOLUTION INTRAMUSCULAR; INTRAVENOUS at 11:09

## 2017-01-01 RX ADMIN — PIPERACILLIN SODIUM, TAZOBACTAM SODIUM 4.5 G: 4; .5 INJECTION, POWDER, LYOPHILIZED, FOR SOLUTION INTRAVENOUS at 12:09

## 2017-01-01 RX ADMIN — VANCOMYCIN HYDROCHLORIDE 750 MG: 750 INJECTION, POWDER, LYOPHILIZED, FOR SOLUTION INTRAVENOUS at 03:09

## 2017-01-01 RX ADMIN — SODIUM CHLORIDE 1000 ML: 0.9 INJECTION, SOLUTION INTRAVENOUS at 10:09

## 2017-01-01 RX ADMIN — DEXTROSE MONOHYDRATE 12.5 G: 25 INJECTION, SOLUTION INTRAVENOUS at 05:09

## 2017-01-01 RX ADMIN — CIPROFLOXACIN 400 MG: 2 INJECTION, SOLUTION INTRAVENOUS at 05:09

## 2017-01-01 RX ADMIN — CEFEPIME HYDROCHLORIDE 1 G: 1 INJECTION, SOLUTION INTRAVENOUS at 02:09

## 2017-01-01 RX ADMIN — DEXTROSE MONOHYDRATE 12.5 G: 25 INJECTION, SOLUTION INTRAVENOUS at 11:09

## 2017-01-01 RX ADMIN — FAMOTIDINE 20 MG: 10 INJECTION INTRAVENOUS at 09:09

## 2017-01-01 RX ADMIN — CLOPIDOGREL BISULFATE 75 MG: 75 TABLET ORAL at 10:09

## 2017-01-01 RX ADMIN — PIPERACILLIN SODIUM, TAZOBACTAM SODIUM 4.5 G: 4; .5 INJECTION, POWDER, LYOPHILIZED, FOR SOLUTION INTRAVENOUS at 01:09

## 2017-01-01 RX ADMIN — SODIUM CHLORIDE: 0.9 INJECTION, SOLUTION INTRAVENOUS at 04:09

## 2017-01-01 RX ADMIN — SODIUM CHLORIDE 1000 ML: 0.9 INJECTION, SOLUTION INTRAVENOUS at 12:09

## 2017-01-01 RX ADMIN — PANTOPRAZOLE SODIUM 40 MG: 40 INJECTION, POWDER, FOR SOLUTION INTRAVENOUS at 09:09

## 2017-01-01 RX ADMIN — SODIUM CHLORIDE 1470 ML: 0.9 INJECTION, SOLUTION INTRAVENOUS at 01:09

## 2017-01-01 RX ADMIN — CEFEPIME HYDROCHLORIDE 2 G: 2 INJECTION, SOLUTION INTRAVENOUS at 02:09

## 2017-01-01 RX ADMIN — PANTOPRAZOLE SODIUM 40 MG: 40 INJECTION, POWDER, FOR SOLUTION INTRAVENOUS at 10:09

## 2017-01-01 RX ADMIN — SODIUM CHLORIDE 250 ML: 0.9 INJECTION, SOLUTION INTRAVENOUS at 08:09

## 2017-01-01 RX ADMIN — CHLORHEXIDINE GLUCONATE 15 ML: 1.2 RINSE ORAL at 09:09

## 2017-01-01 RX ADMIN — FENTANYL CITRATE: 50 INJECTION, SOLUTION INTRAMUSCULAR; INTRAVENOUS at 06:09

## 2017-01-01 RX ADMIN — MAGNESIUM SULFATE IN WATER 2 G: 40 INJECTION, SOLUTION INTRAVENOUS at 08:09

## 2017-01-01 RX ADMIN — DEXTROSE MONOHYDRATE 12.5 G: 25 INJECTION, SOLUTION INTRAVENOUS at 09:09

## 2017-01-01 RX ADMIN — PANTOPRAZOLE SODIUM 40 MG: 40 INJECTION, POWDER, FOR SOLUTION INTRAVENOUS at 08:09

## 2017-01-01 RX ADMIN — CHLORHEXIDINE GLUCONATE 15 ML: 1.2 RINSE ORAL at 10:09

## 2017-01-01 RX ADMIN — SUCCINYLCHOLINE CHLORIDE 60 MG: 20 INJECTION, SOLUTION INTRAMUSCULAR; INTRAVENOUS at 01:09

## 2017-01-01 RX ADMIN — TAMSULOSIN HYDROCHLORIDE 0.4 MG: 0.4 CAPSULE ORAL at 10:09

## 2017-01-01 RX ADMIN — ACETAMINOPHEN 650 MG: 650 SUPPOSITORY RECTAL at 09:09

## 2017-01-01 RX ADMIN — VANCOMYCIN HYDROCHLORIDE 1000 MG: 1 INJECTION, POWDER, LYOPHILIZED, FOR SOLUTION INTRAVENOUS at 02:09

## 2017-01-01 RX ADMIN — MORPHINE SULFATE 0.5 MG: 2 INJECTION, SOLUTION INTRAMUSCULAR; INTRAVENOUS at 03:09

## 2017-01-01 RX ADMIN — FENTANYL CITRATE 25 MCG/HR: 50 INJECTION, SOLUTION INTRAMUSCULAR; INTRAVENOUS at 02:09

## 2017-01-01 RX ADMIN — BISACODYL 10 MG: 10 SUPPOSITORY RECTAL at 03:09

## 2017-01-01 RX ADMIN — SODIUM CHLORIDE: 0.9 INJECTION, SOLUTION INTRAVENOUS at 11:09

## 2017-01-01 RX ADMIN — FAMOTIDINE 20 MG: 10 INJECTION INTRAVENOUS at 10:09

## 2017-01-01 RX ADMIN — PIPERACILLIN SODIUM, TAZOBACTAM SODIUM 4.5 G: 4; .5 INJECTION, POWDER, LYOPHILIZED, FOR SOLUTION INTRAVENOUS at 05:09

## 2017-01-01 RX ADMIN — CEFEPIME HYDROCHLORIDE 1 G: 1 INJECTION, SOLUTION INTRAVENOUS at 03:09

## 2017-01-01 RX ADMIN — ENOXAPARIN SODIUM 40 MG: 100 INJECTION SUBCUTANEOUS at 04:09

## 2017-01-01 RX ADMIN — DEXTROSE MONOHYDRATE 12.5 G: 25 INJECTION, SOLUTION INTRAVENOUS at 02:09

## 2017-01-01 RX ADMIN — ETOMIDATE 12 MG: 2 INJECTION, SOLUTION INTRAVENOUS at 01:09

## 2017-01-01 RX ADMIN — DEXTROSE MONOHYDRATE AND SODIUM CHLORIDE: 5; .45 INJECTION, SOLUTION INTRAVENOUS at 10:09

## 2017-01-01 RX ADMIN — PIPERACILLIN SODIUM, TAZOBACTAM SODIUM 4.5 G: 4; .5 INJECTION, POWDER, LYOPHILIZED, FOR SOLUTION INTRAVENOUS at 06:09

## 2017-01-01 RX ADMIN — PROPOFOL 15 MG: 10 INJECTION, EMULSION INTRAVENOUS at 01:09

## 2017-01-01 RX ADMIN — ASPIRIN 81 MG 81 MG: 81 TABLET ORAL at 10:09

## 2017-01-01 RX ADMIN — ACETAMINOPHEN 325 MG: 325 SUPPOSITORY RECTAL at 05:09

## 2017-01-01 RX ADMIN — PIPERACILLIN SODIUM, TAZOBACTAM SODIUM 4.5 G: 4; .5 INJECTION, POWDER, LYOPHILIZED, FOR SOLUTION INTRAVENOUS at 10:09

## 2017-01-01 RX ADMIN — CYANOCOBALAMIN TAB 250 MCG 250 MCG: 250 TAB at 10:09

## 2017-08-02 NOTE — ED NOTES
Pt arrives via [facility] following a fall from his wheelchair at [facility]. Arrives with hematoma to left forehead. Dressing to site CDI on arrival. Pt is nonverbal at baseline, per [facility] staff. C-collar in place on arrival. Pt refused spine board in ambulance. Pt is lying comfortably in bed. NAD noted.

## 2017-08-02 NOTE — ED PROVIDER NOTES
Encounter Date: 8/2/2017       History     Chief Complaint   Patient presents with    Fall     fell out of wheelchair at [facility].  No LOC.  Has hematoma to left forehead.  Bleeding is controlled.  Patient is in c-collar     83 Y/O MALE PRESENTS [facility] FOR [facility]ALUATION S/P FALL.  PT WAS IN A W/C AND FELL FORWARD HITTING HIS HEAD SUSTAINING AN ABRASION AND HEMATOMA.  NO LONG BONE TRAUMA.  PT IS NON-VERBAL.  PT IS AT HIS BASELINE PER [facility] STAFF.           Review of patient's allergies indicates:  No Known Allergies  Past Medical History:   Diagnosis Date    Coronary artery disease     DVT (deep venous thrombosis)     Hypertension     Prostate disease      History reviewed. No pertinent surgical history.  History reviewed. No pertinent family history.  Social History   Substance Use Topics    Smoking status: Never Smoker    Smokeless tobacco: Never Used    Alcohol use No     Review of Systems   Unable to perform ROS: Patient nonverbal       Physical Exam     Initial Vitals [08/02/17 0754]   BP Pulse Resp Temp SpO2   (!) 148/74 72 16 98.1 °F (36.7 °C) 100 %      MAP       98.67         Physical Exam    Constitutional: He appears well-developed and well-nourished.   CONTRACTED 83 Y/O MALE   HENT:   Head: Normocephalic.   Right Ear: External ear normal.   Left Ear: External ear normal.   Nose: Nose normal.   Mouth/Throat: Oropharynx is clear and moist.   SUPERFICIAL FOREHEAD ABRASION AND CONTUSION   Eyes: Conjunctivae and EOM are normal. Pupils are equal, round, and reactive to light.   Neck:   C-COLLAR IN PLACE, NO BONY STEP OFF   Cardiovascular: Normal rate, regular rhythm and normal heart sounds.   Pulmonary/Chest: Breath sounds normal. No respiratory distress.   Abdominal: Soft. Bowel sounds are normal. He exhibits no distension.   Musculoskeletal:   EXTREMITIES ARE CONTRACTED.  NO SIGNS OF INJURY   Neurological:   PT IS NON-VERBAL, ALERT WITH EYES OPEN   Skin: No rash noted. No erythema.   FOREHEAD  ABRASION AND CONTUSION   Psychiatric:   UNABLE TO ASSES         ED Course   Procedures  Labs Reviewed - No data to display          Medical Decision Making:   Initial Assessment:   81 Y/O MALE PRESENTS FROM [facility] FOR EVALUATION S/P FALL.  PT WAS IN A W/C AND FELL FORWARD HITTING HIS HEAD SUSTAINING AN ABRASION AND HEMATOMA.  NO LONG BONE TRAUMA.  PT IS NON-VERBAL.  PT IS AT HIS BASELINE PER NH STAFF.           Differential Diagnosis:   DDX: FRACTURE, SUBDURAL HEMATOMA, ICH, ABRASION, CONTUSION  ED Management:  CT BRAIN AND C-COLLAR NEG.  PT D/C BACK TO NURSING HOME                   ED Course     Clinical Impression:   The primary encounter diagnosis was Forehead contusion, initial encounter. A diagnosis of Fall was also pertinent to this visit.    Disposition:   Disposition: Discharged  Condition: Stable                        Codie Santiago MD  08/02/17 5313

## 2017-08-02 NOTE — ED NOTES
Report given to RN at [facility] in [place], reports that pt will have to be transported via ambulance

## 2017-09-06 PROBLEM — J18.9 SEPSIS DUE TO PNEUMONIA: Status: ACTIVE | Noted: 2017-01-01

## 2017-09-06 PROBLEM — L89.152 SACRAL DECUBITUS ULCER, STAGE II: Status: ACTIVE | Noted: 2017-01-01

## 2017-09-06 PROBLEM — A41.9 SEPSIS DUE TO PNEUMONIA: Status: ACTIVE | Noted: 2017-01-01

## 2017-09-06 PROBLEM — J18.9 PNEUMONIA OF RIGHT UPPER LOBE DUE TO INFECTIOUS ORGANISM: Status: ACTIVE | Noted: 2017-01-01

## 2017-09-06 NOTE — PLAN OF CARE
Dr. Lau called and notified that pt is moaning constantly, appears restless in bed, pt abdomen feels very rigid. MD orders stat Abd xray, xray tech called and notified.

## 2017-09-06 NOTE — CONSULTS
[facility]  Cardiology  Consult Note    Patient Name: Simeon Marvin  MRN: [id number]  Admission Date: 9/6/2017  Hospital Length of Stay: 0 days  Code Status: Full Code   Attending Provider: Rick Siegel MD   Consulting Provider: Jermaine Dodson NP  Primary Care Physician: Ever Price MD  Principal Problem:Sepsis due to pneumonia    Patient information was obtained from past medical records and ER records.     Inpatient consult to [facility]  Consult performed by: JERMAINE DODSON  Consult ordered by: SANDRA CHILDS        Subjective:     Chief Complaint:  Sepsis            HPI:   Simeon Marvin is an 82 y.o.  male with hx of previous CVA with residual weakness, he is contracted, Aortic Valve Endocarditis, HTN, BPH from [facility] for SOB and failure to thrive. HPI retrieved from chart as patient is nonverbal and minimally responsive.  EMS was notified that patient has had several days of decreased PO intake and decreased urine output. He was noted to be SOB by nursing home staff thus EMS was called.   Regarding Aortic valve endocarditis: Chart review notes patient was hospitalized in 7/16 with an ischemic stroke. Even prior to the stroke pt had been institutionalized at nursing home due to extremely poor functional status and inability of family to care for him at home anymore. LVEF was noted normal.  ANISH showed an aortic valve vegetation. Blood culture was positive for staph epi.  P atient received one month of IV ceftriaxone after developing a rash from vancomycin.   Current hospitalization: Blood, urine, and sputum cultures are in process  TTE today:  CONCLUSIONS     1 - Normal left ventricular systolic function (EF 60-65%).     2 - Wall motion abnormalities. The following segments were hypokinetic: basal inferolateral wall    3 - Concentric remodeling.     4 - Impaired LV relaxation, normal LAP (grade 1 diastolic dysfunction).     5 - Normal right ventricular systolic  function .     6 - The estimated PA systolic pressure is 21 mmHg.     7 - Mild aortic regurgitation.     8 - Mild tricuspid regurgitation.   CXR concerning for PNA; on Bipap at present   He is febrile from 100.9-102.5, HR 80s-120s, -150s with notation of downward trend at noon of SBP in the 80s   Lactate 4.5 on admission and 3.8 today, procal .38;  BUN 51, Cr 1.1; WBC ct 1.6- on neutropenic precautions   On Cefepime, Cipro, and Vanc for suspected PNA vs endocarditis     Past Medical History:   Diagnosis Date    Coronary artery disease     DVT (deep venous thrombosis)     Hypertension     Prostate disease     Stroke        History reviewed. No pertinent surgical history.    Review of patient's allergies indicates:  No Known Allergies    No current facility-administered medications on file prior to encounter.      Current Outpatient Prescriptions on File Prior to Encounter   Medication Sig    aspirin 81 MG Chew Take 1 tablet (81 mg total) by mouth once daily.    cetirizine (ZYRTEC) 10 MG tablet Take 1 tablet (10 mg total) by mouth 2 (two) times daily.    clopidogrel (PLAVIX) 75 mg tablet Take 1 tablet (75 mg total) by mouth once daily.    cyanocobalamin (VITAMIN B-12) 500 MCG tablet Take 500 mcg by mouth once daily.    levothyroxine (SYNTHROID) 100 mcg injection Inject 12.5 mcg into the vein once daily.    losartan (COZAAR) 50 MG tablet Take 1 tablet (50 mg total) by mouth once daily.    potassium phosphate, monobasic, (K-PHOS) 500 mg TbSO Take 1 tablet (500 mg total) by mouth once daily.    tamsulosin (FLOMAX) 0.4 mg Cp24 Take 0.4 mg by mouth once daily.    triamcinolone acetonide 0.1% (KENALOG) 0.1 % cream Apply topically 2 (two) times daily. Apply to affected areas.     Family History     None        Social History Main Topics    Smoking status: Never Smoker    Smokeless tobacco: Never Used    Alcohol use No    Drug use: No    Sexual activity: Not on file     Review of Systems   Unable to  perform ROS: patient nonverbal     Objective:     Vital Signs (Most Recent):  Temp: 99.3 °F (37.4 °C) (09/06/17 1130)  Pulse: 89 (09/06/17 1230)  Resp: (!) 25 (09/06/17 1230)  BP: 114/64 (09/06/17 1300)  SpO2: 100 % (09/06/17 1230) Vital Signs (24h Range):  Temp:  [99.3 °F (37.4 °C)-102.5 °F (39.2 °C)] 99.3 °F (37.4 °C)  Pulse:  [] 89  Resp:  [21-38] 25  SpO2:  [97 %-100 %] 100 %  BP: ()/(45-89) 114/64     Weight: 39.5 kg (87 lb 1.3 oz)  Body mass index is 18.2 kg/m².    SpO2: 100 %  O2 Device (Oxygen Therapy): BiPAP      Intake/Output Summary (Last 24 hours) at 09/06/17 1413  Last data filed at 09/06/17 1100   Gross per 24 hour   Intake           1777.5 ml   Output              115 ml   Net           1662.5 ml       Lines/Drains/Airways     Drain                 Urethral Catheter 09/06/17 0517 Latex 16 Fr. less than 1 day          Pressure Ulcer                 Pressure Ulcer 09/06/17 0600  buttocks Stage II less than 1 day          Peripheral Intravenous Line                 Peripheral IV - Single Lumen 09/06/17 0203 Right Forearm less than 1 day         Peripheral IV - Single Lumen 09/06/17 0203 Right Hand less than 1 day                Physical Exam   Constitutional: He appears lethargic. He appears cachectic. He appears ill.   Withdraws to pain    Eyes: Right eye exhibits no discharge. Left eye exhibits no discharge.   Cardiovascular: Regular rhythm.  Tachycardia present.  Exam reveals no gallop.    No murmur heard.  Pulmonary/Chest: Tachypnea noted. He has decreased breath sounds.   Abdominal: Bowel sounds are normal. There is tenderness.   Musculoskeletal: He exhibits no edema.   Neurological: He appears lethargic. He displays atrophy.   Skin: Skin is warm and dry.       Significant Labs:   ABG:   Recent Labs  Lab 09/06/17  0152   PH 7.413   PCO2 29.5*   HCO3 18.8*   POCSATURATED 100   BE -6   , Blood Culture: No results for input(s): LABBLOO in the last 48 hours., BMP:   Recent Labs  Lab  09/06/17  0139   *      K 4.5      CO2 17*   BUN 51*   CREATININE 1.1   CALCIUM 8.6*   MG 2.0   , CMP   Recent Labs  Lab 09/06/17  0139      K 4.5      CO2 17*   *   BUN 51*   CREATININE 1.1   CALCIUM 8.6*   PROT 7.3   ALBUMIN 2.8*   BILITOT 0.4   ALKPHOS 98   AST 91*   ALT 74*   ANIONGAP 13   ESTGFRAFRICA >60   EGFRNONAA >60   , CBC   Recent Labs  Lab 09/06/17  0139   WBC 1.61*   HGB 11.0*   HCT 33.7*      , INR   Recent Labs  Lab 09/06/17  0139   INR 1.0   , Lipid Panel No results for input(s): CHOL, HDL, LDLCALC, TRIG, CHOLHDL in the last 48 hours., Troponin   Recent Labs  Lab 09/06/17  0139   TROPONINI 0.010    and All pertinent lab results from the last 24 hours have been reviewed.    Significant Imaging: Echocardiogram:   2D echo with color flow doppler:   Results for orders placed or performed during the hospital encounter of 09/06/17   2D echo with color flow doppler   Result Value Ref Range    EF 60 55 - 65    Diastolic Dysfunction Yes (A)     Aortic Valve Regurgitation MILD     Est. PA Systolic Pressure 21.49     Pericardial Effusion NONE     Tricuspid Valve Regurgitation MILD     and X-Ray: CXR: X-Ray Chest 1 View (CXR): No results found for this visit on 09/06/17.    Assessment and Plan:     Aortic valve endocarditis    Chart review notes patient was hospitalized in 7/16 with an ischemic stroke.   LVEF was noted normal.    ANISH showed an aortic valve vegetation. Blood culture was positive for staph epi.  Patient received one month of IV ceftriaxone after developing a rash from vancomycin.   Current hospitalization: Blood, urine, and sputum cultures are in process  TTE today:  CONCLUSIONS     1 - Normal left ventricular systolic function (EF 60-65%).     2 - Wall motion abnormalities. The following segments were hypokinetic: basal inferolateral wall    3 - Concentric remodeling.     4 - Impaired LV relaxation, normal LAP (grade 1 diastolic dysfunction).     5 -  Normal right ventricular systolic function .     6 - The estimated PA systolic pressure is 21 mmHg.     7 - Mild aortic regurgitation.     8 - Mild tricuspid regurgitation.   CXR concerning for PNA; on Bipap at present   He is febrile from 100.9-102.5, HR 80s-120s, -150s with notation of downward trend at noon of SBP in the 80s   Lactate 4.5 on admission and 3.8 today, procal .38;  BUN 51, Cr 1.1; WBC ct 1.6- on neutropenic precautions   On Cefepime, Cipro, and Vanc for suspected PNA vs endocarditis   Patient poor candidate for ANISH  Recommend treating empirically for endocarditis given history and septic presentation  Long term prognosis poor given his cachetic, malnourished state, physical debility and severely impaired mentation at baseline             VTE Risk Mitigation         Ordered     enoxaparin injection 40 mg  Daily     Route:  Subcutaneous        09/06/17 0537     Medium Risk of VTE  Once      09/06/17 0537            Thank you for your consult. I will follow-up with patient. Please contact us if you have any additional questions.    Jermaine Mak NP  Cardiology   [facility]

## 2017-09-06 NOTE — PROGRESS NOTES
"Vancomycin Dosing and Monitoring Pharmacy Protocol    Simeon Marvin is a 82 y.o. male    Height: 5' 8" (1.727 m)   Wt Readings from Last 1 Encounters:   09/06/17 49 kg (108 lb)     Male patients must weigh at least 50 kg to calculate ideal body weight    Temp Readings from Last 3 Encounters:   09/06/17 (!) 102.5 °F (39.2 °C)   08/02/17 98.1 °F (36.7 °C) (Oral)   07/27/16 99.2 °F (37.3 °C) (Oral)      Lab Results   Component Value Date/Time    WBC 1.61 (LL) 09/06/2017 01:39 AM    WBC 8.04 07/27/2016 04:26 AM    WBC 7.45 07/26/2016 06:27 AM      Lab Results   Component Value Date/Time    CREATININE 1.1 09/06/2017 01:39 AM    CREATININE 1.0 07/27/2016 04:26 AM    CREATININE 1.0 07/26/2016 06:27 AM        Serum creatinine: 1.1 mg/dL 09/06/17 0139  Estimated creatinine clearance: 35.9 mL/min    Antibiotics       Start     Stop Route Frequency Ordered    09/06/17 1500  vancomycin 750 mg in dextrose 5 % 250 mL IVPB (ready to mix system)  (Vancomycin IVPB with levels panel)      -- IV Every 12 hours (non-standard times) 09/06/17 0537    09/06/17 1500  cefepime in dextrose 5 % 1 gram/50 mL IVPB 1 g      -- IV Every 12 hours (non-standard times) 09/06/17 0420    09/06/17 0515  ciprofloxacin (CIPRO)400mg/200ml D5W IVPB 400 mg      -- IV Every 12 hours (non-standard times) 09/06/17 0406          Antifungals       None            Microbiology Results (last 7 days)       Procedure Component Value Units Date/Time    Culture, Respiratory [[id number]]     Order Status:  No result Specimen:  Respiratory from Sputum, Expectorated     Blood culture x two cultures. Draw prior to antibiotics. [[id number]] Collected:  09/06/17 0140    Order Status:  Sent Specimen:  Blood from Peripheral, Forearm, Right Updated:  09/06/17 0510    Blood culture x two cultures. Draw prior to antibiotics. [[id number]] Collected:  09/06/17 0140    Order Status:  Sent Specimen:  Blood from Peripheral, Hand, Right Updated:  09/06/17 0510    Urine culture [[id number]] " Collected:  17 0202    Order Status:  Sent Specimen:  Urine from Urine, Catheterized Updated:  17 0510            Indication:   Pneumonia    Target Level: 15-20 mcg/ml    Hemodialysis:   No on N/A    Dosing Weight:   ABW--actual body weight  If ABW is greater than or equal to 30% over Ideal Body Weight, AdjBW will be used to calculate vancomycin dose.    Last Vancomycin dose: 1000 mg   Date/Time given:  02:53          Vancomycin level:  No results for input(s): VANCOMYCIN-TROUGH in the last 72 hours.  No results for input(s): VANCOMYCIN, RANDOM in the last 72 hours.    Per Protocol Initial/Adjustments Dosin. Initial/Adjustment Dose: DECREASE Vancomycin will be adjusted from 750mg q12hr to 750mg q24hr  2. Vancomycin Trough Level will be drawn on  @02:00date/time    Pharmacy will continue to follow.    Please contact if you have any further questions. Thank you.    Danish Morley, PharmD  [phone]

## 2017-09-06 NOTE — H&P
[facility]  History & Physical    Patient name: Simeon Marvin  MRN: [id number]  Date of admission: 9/6/17  PCP: Ever Price  Admitting attending: Dr. Siegel    Chief complaint: SOB and failure to thrive  SUBJECTIVE:     Chief Complaint/Reason for Admission:  Sepsis 2/2 PNA    History of Present Illness:  Patient is a 82 y.o.  male with hx of previous CVA with residual weakness and contractures, Aortic Valve Endocarditis (treated with 1 month rocephin in July), HTN, BPH BIBA from [facility] for SOB and failure to thrive.  EMS was notified as patient has had several days of decreased PO intake and decreased urine output. He was noted to be SOB by nursing home staff and EMS was called.   Per chart review, at baseline, patient is non-verbal and not able to ambulate/bed bound.  He did have ED visit last month for fall from wheelchair. He requires assistance eating.     On exam, patient is sleeping but wakes to pain.    Patient is non-verbal at baseline. History obtained from chart review and ED Physician.       (Not in a hospital admission)    Review of patient's allergies indicates:  No Known Allergies    Past Medical History:   Diagnosis Date    Coronary artery disease     DVT (deep venous thrombosis)     Hypertension     Prostate disease     Stroke      History reviewed. No pertinent surgical history.  History reviewed. No pertinent family history.  Social History   Substance Use Topics    Smoking status: Never Smoker    Smokeless tobacco: Never Used    Alcohol use No        Review of Systems:  Review of systems not obtained due to patient factors non-verbal.    OBJECTIVE:     Vital Signs (Most Recent):  Temp: (!) 102.5 °F (39.2 °C) (09/06/17 0440)  Pulse: 108 (09/06/17 0440)  Resp: (!) 22 (09/06/17 0440)  BP: (!) 147/70 (09/06/17 0440)  SpO2: 100 % (09/06/17 0440)    Physical Exam:  Physical Exam   Constitutional:  Sleeping but wakes to pain. Cpap in place. Very thin, contracted,  cachectic   HENT: ncat, perrla, neck normal rom and supple   Cardiovascular: tachy, no murmurs, rubs, gallops  Pulmonary/Chest: increased rr, course breath sounds bilaterally, cpap  Abdominal: Soft. +bs, ntnd  Musculoskeletal: contractures of upper and lower extremities  Neurological: awakes to pain, non-verbal     Skin: Skin is warm and dry. not diaphoretic.   Nursing note and vitals reviewed.      Laboratory:  LABS  CBC    Recent Labs  Lab 09/06/17  0139   WBC 1.61*   RBC 3.68*   HGB 11.0*   HCT 33.7*      MCV 92   MCH 29.9   MCHC 32.6     BMP    Recent Labs  Lab 09/06/17  0139      K 4.5   CO2 17*      BUN 51*   CREATININE 1.1   *       Recent Labs  Lab 09/06/17  0139   CALCIUM 8.6*   MG 2.0   PHOS 2.9     LFT    Recent Labs  Lab 09/06/17  0139   PROT 7.3   ALBUMIN 2.8*   BILITOT 0.4   AST 91*   ALKPHOS 98   ALT 74*       COAGS    Recent Labs  Lab 09/06/17  0139   INR 1.0   APTT 27.9     CE    Recent Labs  Lab 09/06/17  0139   TROPONINI 0.010     ABGs    Recent Labs  Lab 09/06/17  0152   PH 7.413   PCO2 29.5*   PO2 284*   HCO3 18.8*   POCSATURATED 100   BE -6     BNP    Recent Labs  Lab 09/06/17  0139   *     UA    Recent Labs  Lab 09/06/17  0202   COLORU Yellow   SPECGRAV 1.015   PHUR 6.0   PROTEINUA Trace*     LA  4.5    Diagnostic Results:  CXR: Airspace opacity in the right upper lobe concerning for pneumonia.    ASSESSMENT/PLAN:     83 yo male with hx of CVA, HTN, BPH, hypothyroidism presented from nursing facility with SOB and FTT and found to have sepsis 2/2 pna.      Neuro  - wakes to pain  - non verbal, non mobile at baseline   - contractures in upper and lower extremities  - no family or nursing home staff to verify if patient at or below baseline.   - previous CVA: home meds: asa and plavix continued    [facility]  - Hx of HTN : home medications: cozaar 50mg continued    Resp  - RUL infiltrate concerning for PNA on CXR  - Requiring BiPAP: wean as tolerating     FEN/GI  -  Fluids: 30cc/kg per sepsis protocols given in ED, continued IVF @ 50cc/hr.   - Electrolytes: will continue to monitor and replete PRN   - NPO for now: patient cacethic with recent decrease in po intake consider nutrition consult   - Elevated LFTs/transaminitis : possibly 2/2 sepsis vs obstruction vs Chronic HEP C  will continue to monitor consider US, GGT, Hep viral load.   - Lipase elevated at 120, IVF, NPO    Renal  - on admission Bun/cr 51/1.1   - baseline from 2016 bun 11/ cr 1.0  - IVF  - Will continue to monitor    ID  Sepsis 2/2 PNA vs Endocarditis   - On admission WBC 1.6, Temp 101.9, HR 11, RR 23  - Source PNA seen on CXR vs reoccurrence of endocarditis (treated in July with 1 month iv abx)   - IVF : 30cc/kg given in ED  - IVabx: Vanc and cefepime in ED  Will continue and add Cipro   - Will trend LA Q 4hours    MSK  - sacral decubitus ulcer stage 2  - wound care     Endocrine  Hx of Hypothyroidism  TSH pending     on admission  A1c pending  SSI  POCT gluocose    Code: full  Diet: npo  Ppx: pepcid, lovenox  Dispo: follow up cultures, LA, CBC, wean bipap as tolerated.   Power of : Daughter, Melanie Marvin [phone]    Rebecca White D.O.  [facility] HO-2  09/06/2017

## 2017-09-06 NOTE — PROGRESS NOTES
Dr. Huggins notified of pt vital signs, orders received to bolus 1L NS. Orders received and implemented care is ongoing will continue to monitor pt status.      09/06/17 1015   Vital Signs   Pulse 100   Resp (!) 21   SpO2 100 %   BP (!) 84/45   MAP (mmHg) 60

## 2017-09-06 NOTE — ED PROVIDER NOTES
Encounter Date: 9/6/2017       History     Chief Complaint   Patient presents with    Shortness of Breath     pt arrived with cpap and ems reports called  for shortness of breath; also reported several days of decreased intake and decresed urine output; pt is moaning and nonverbal and withdraws to pain; o2 sat 86% on nasal cannula and 97% on cpap; RR 36,     Failure To Thrive     no iv access as yet; cbg----122; pt is from [facility]; pt is diapered     The patient presents emergency Department with shortness of breath tonight at the nursing home.  The patient is a dementia patient with contractures, status post a CVA and is a full code patient.          Review of patient's allergies indicates:  No Known Allergies  Past Medical History:   Diagnosis Date    Coronary artery disease     DVT (deep venous thrombosis)     Hypertension     Prostate disease     Stroke      History reviewed. No pertinent surgical history.  History reviewed. No pertinent family history.  Social History   Substance Use Topics    Smoking status: Never Smoker    Smokeless tobacco: Never Used    Alcohol use No     Review of Systems   Unable to perform ROS: Mental status change   Respiratory: Positive for shortness of breath.        Physical Exam     Initial Vitals   BP Pulse Resp Temp SpO2   09/06/17 0130 09/06/17 0130 09/06/17 0130 09/06/17 0131 09/06/17 0130   121/65 (!) 131 (!) 36 (!) 102 °F (38.9 °C) 97 %      MAP       09/06/17 0130       83.67         Physical Exam    Nursing note and vitals reviewed.  Constitutional: He appears distressed.   Contractures, very thin, cachectic   HENT:   Head: Normocephalic and atraumatic.   Neck: Neck supple.   Cardiovascular: Normal heart sounds and intact distal pulses.   tachycardic   Pulmonary/Chest: He has rhonchi (bilateral bases).   tachypnic   Abdominal: Soft. Bowel sounds are normal. He exhibits no distension. There is no tenderness.   Musculoskeletal: He exhibits no  edema.   contractures   Neurological:   unresponsive   Skin: Skin is warm and dry.   Psychiatric:   unresponsive         ED Course   Procedures  Labs Reviewed   B-TYPE NATRIURETIC PEPTIDE - Abnormal; Notable for the following:        Result Value     (*)     All other components within normal limits   CBC W/ AUTO DIFFERENTIAL - Abnormal; Notable for the following:     WBC 1.61 (*)     RBC 3.68 (*)     Hemoglobin 11.0 (*)     Hematocrit 33.7 (*)     Gran # 0.9 (*)     Lymph # 0.7 (*)     Mono # 0.0 (*)     Mono% 1.2 (*)     All other components within normal limits    Narrative:       WBCIR critical result(s) called and verbal readback obtained from   Anastasia Burch RN, 09/06/2017 03:09   COMPREHENSIVE METABOLIC PANEL - Abnormal; Notable for the following:     CO2 17 (*)     Glucose 181 (*)     BUN, Bld 51 (*)     Calcium 8.6 (*)     Albumin 2.8 (*)     AST 91 (*)     ALT 74 (*)     All other components within normal limits   LACTIC ACID, PLASMA - Abnormal; Notable for the following:     Lactate (Lactic Acid) 4.5 (*)     All other components within normal limits    Narrative:       LA critical result(s) called and verbal readback obtained from   Anastasia Burch RN, 09/06/2017 03:14   LIPASE - Abnormal; Notable for the following:     Lipase 120 (*)     All other components within normal limits   URINALYSIS - Abnormal; Notable for the following:     Protein, UA Trace (*)     All other components within normal limits   ISTAT PROCEDURE - Abnormal; Notable for the following:     POC PCO2 29.5 (*)     POC PO2 284 (*)     POC HCO3 18.8 (*)     POC TCO2 20 (*)     All other components within normal limits   CULTURE, BLOOD   CULTURE, BLOOD   CULTURE, URINE   APTT   MAGNESIUM   PHOSPHORUS   PROTIME-INR   TROPONIN I   CORTISOL, RANDOM   LACTIC ACID, PLASMA   PROCALCITONIN   TSH     EKG Readings: (Independently Interpreted)   Initial Reading: No STEMI. Rhythm: Sinus Tachycardia. Heart Rate: 132. Ectopy: No Ectopy. ST Segments:  Normal ST Segments. T Waves: Normal. Clinical Impression: Sinus Tachycardia          Medical Decision Making:   Clinical Tests:   Lab Tests: Ordered and Reviewed  The following lab test(s) were unremarkable: CBC, CMP, Urinalysis, Troponin, BNP, Lipase, Lactate, PT and PTT  Radiological Study: Ordered and Reviewed  Medical Tests: Ordered and Reviewed  ED Management:  Sepsis, aspiration pneumonia, HCAP  Patient lives in a NH, he is fed orally, no PEG tube. He has contractures of all extremities. Very likely candidate to aspirate. Rectal temp 100.9    0200: Patient will be signed out to Dr. Burr for evaluation of labs, CXR and disposition.        Leno: CXR RUL infiltrate.  Labs reviewed  D[facility] who will admit.           ED Course      Clinical Impression:   The primary encounter diagnosis was Pneumonia of right upper lobe due to infectious organism. A diagnosis of Sepsis, due to unspecified organism was also pertinent to this visit.    Disposition:   Disposition: Admitted  Condition: Serious                        Xavier Burr MD  09/06/17 0331

## 2017-09-06 NOTE — PLAN OF CARE
Problem: Patient Care Overview  Goal: Plan of Care Review  Pt. On Bipap of  +10  /+5     . With a FIO2 of   80 % and Spo2 of  %99.  Pt appears to be in no distress and will continue to monitor

## 2017-09-06 NOTE — PLAN OF CARE
Dr. Huggins notified suppository unsuccessful pt only had smear, it appears as if pt is impacted. MD stated MD will be by later to disimpact pt. Care is ongoing will continue to monitor pt status.

## 2017-09-06 NOTE — PLAN OF CARE
Problem: Patient Care Overview  Goal: Plan of Care Review  Outcome: Ongoing (interventions implemented as appropriate)  Recommendation/Intervention:   1. If pt to remain NPO, consider enteral feeds (if family desires): Isosource 1.5 at 10ml/hr and advance as tolerated to goal rate of 40ml/hr to provide 1440 kcal, 65g protein, & 733ml free water.     Goals:   Diet or TF will be started within 24 hours  Nutrition Goal Status: new  Communication of RD Recs: reviewed with RN

## 2017-09-06 NOTE — ED TRIAGE NOTES
Patient presents to ED by EMS with complaints of Failure to thrive. Patient from [facility]. Facility states patient has not been eating or drinking for 3 days. Today patient was having trouble breathing. O2 sats were low. Patient is very think andcontracted in all extremities. Patient is nonverbal but will respond to pain. Patient temp is 100.9 rectally.  No swelling noted to extremities. Patient is breathing with exertion. History of HTN and CAD. Patient is full code

## 2017-09-06 NOTE — CONSULTS
"  [facility]  Adult Nutrition  Consult Note    SUMMARY     Recommendations    Recommendation/Intervention:   1. If pt to remain NPO, consider enteral feeds (if family desires): Isosource 1.5 at 10ml/hr and advance as tolerated to goal rate of 40ml/hr to provide 1440 kcal, 65g protein, & 733ml free water.     Goals:   Diet or TF will be started within 24 hours  Nutrition Goal Status: new  Communication of RD Recs: reviewed with RN    Continuum of Care Plan  Referral to Outpatient Services:  (d/c needs to be determined)    Reason for Assessment  Reason for Assessment: nurse/nurse practitioner consult (failure to thrive)  Diagnosis: infection/sepsis  Relevent Medical History: prostate disease, CAD, HTN, DVT, stroke      General Information Comments: Pt NPO. On bipap. Noted pt NH resident- has not eaten in 3 days.     Nutrition Prescription Ordered  Current Diet Order: NPO     Evaluation of Received Nutrients/Fluid Intake  % Intake of Estimated Energy Needs: Other: NPO  % Meal Intake: NPO      Nutrition/Diet History  Food Preferences: no Restorationist or cultural food prefs identified  Factors Affecting Nutritional Intake: NPO     Labs/Tests/Procedures/Meds  Pertinent Labs Reviewed: reviewed, pertinent  Pertinent Labs Comments: BUN 51H, Glu 181H, Ca 8.6L, Alb 2.8L  Pertinent Medications Reviewed: reviewed     Physical Findings  Overall Physical Appearance: on oxygen therapy, underweight  Tubes:  (none)  Oral/Mouth Cavity:  (unable to assess-wearing bipap)  Skin:  (Scott 8-Stg II buttocks)    Anthropometrics  Temp: 99.3 °F (37.4 °C)  Height: 4' 10" (147.3 cm)  Weight Method: Bed Scale  Weight: 39.5 kg (87 lb 1.3 oz)  Ideal Body Weight (IBW), Male: 94 lb  % Ideal Body Weight, Male (lb): 92.64 lb  BMI (Calculated): 18.2  BMI Grade: 17 - 18.4 protein-energy malnutrition grade I     Estimated/Assessed Needs  Weight Used For Calorie Calculations: 39.5 kg (87 lb 1.3 oz)   Energy Need Method: Kcal/kg (6930-5164 " (35-40 kcal/kg))  RMR ([place]-St. Jeor Equation): 910.75  Weight Used For Protein Calculations: 39.5 kg (87 lb 1.3 oz)  Protein Requirements: 47g (1.2 g/kg)  Fluid Need Method:  (1ml/kcal)     Assessment and Plan  No new Assessment & Plan notes have been filed under this hospital service since the last note was generated.  Service: Nutrition    Monitor and Evaluation  Food and Nutrient Intake: energy intake  Food and Nutrient Adminstration: diet order, enteral and parenteral nutrition administration  Physical Activity and Function: nutrition-related ADLs and IADLs  Anthropometric Measurements: weight  Biochemical Data, Medical Tests and Procedures: electrolyte and renal panel  Nutrition-Focused Physical Findings: overall appearance    Nutrition Risk  Level of Risk:  (2xweekly)    Nutrition Follow-Up  RD Follow-up?: Yes

## 2017-09-06 NOTE — ASSESSMENT & PLAN NOTE
Chart review notes patient was hospitalized in 7/16 with an ischemic stroke.   LVEF was noted normal.    ANISH showed an aortic valve vegetation. Blood culture was positive for staph epi.  Patient received one month of IV ceftriaxone after developing a rash from vancomycin.   Current hospitalization: Blood, urine, and sputum cultures are in process  TTE today:  CONCLUSIONS     1 - Normal left ventricular systolic function (EF 60-65%).     2 - Wall motion abnormalities. The following segments were hypokinetic: basal inferolateral wall    3 - Concentric remodeling.     4 - Impaired LV relaxation, normal LAP (grade 1 diastolic dysfunction).     5 - Normal right ventricular systolic function .     6 - The estimated PA systolic pressure is 21 mmHg.     7 - Mild aortic regurgitation.     8 - Mild tricuspid regurgitation.   CXR concerning for PNA; on Bipap at present   He is febrile from 100.9-102.5, HR 80s-120s, -150s with notation of downward trend at noon of SBP in the 80s   Lactate 4.5 on admission and 3.8 today, procal .38;  BUN 51, Cr 1.1; WBC ct 1.6- on neutropenic precautions   On Cefepime, Cipro, and Vanc for suspected PNA vs endocarditis   Patient poor candidate for ANISH  Recommend treating empirically for endocarditis given history and septic presentation  Long term prognosis poor given his cachetic, malnourished state, physical debility and severely impaired mentation at baseline

## 2017-09-06 NOTE — SUBJECTIVE & OBJECTIVE
Past Medical History:   Diagnosis Date    Coronary artery disease     DVT (deep venous thrombosis)     Hypertension     Prostate disease     Stroke        History reviewed. No pertinent surgical history.    Review of patient's allergies indicates:  No Known Allergies    No current facility-administered medications on file prior to encounter.      Current Outpatient Prescriptions on File Prior to Encounter   Medication Sig    aspirin 81 MG Chew Take 1 tablet (81 mg total) by mouth once daily.    cetirizine (ZYRTEC) 10 MG tablet Take 1 tablet (10 mg total) by mouth 2 (two) times daily.    clopidogrel (PLAVIX) 75 mg tablet Take 1 tablet (75 mg total) by mouth once daily.    cyanocobalamin (VITAMIN B-12) 500 MCG tablet Take 500 mcg by mouth once daily.    levothyroxine (SYNTHROID) 100 mcg injection Inject 12.5 mcg into the vein once daily.    losartan (COZAAR) 50 MG tablet Take 1 tablet (50 mg total) by mouth once daily.    potassium phosphate, monobasic, (K-PHOS) 500 mg TbSO Take 1 tablet (500 mg total) by mouth once daily.    tamsulosin (FLOMAX) 0.4 mg Cp24 Take 0.4 mg by mouth once daily.    triamcinolone acetonide 0.1% (KENALOG) 0.1 % cream Apply topically 2 (two) times daily. Apply to affected areas.     Family History     None        Social History Main Topics    Smoking status: Never Smoker    Smokeless tobacco: Never Used    Alcohol use No    Drug use: No    Sexual activity: Not on file     Review of Systems   Unable to perform ROS: patient nonverbal     Objective:     Vital Signs (Most Recent):  Temp: 99.3 °F (37.4 °C) (09/06/17 1130)  Pulse: 89 (09/06/17 1230)  Resp: (!) 25 (09/06/17 1230)  BP: 114/64 (09/06/17 1300)  SpO2: 100 % (09/06/17 1230) Vital Signs (24h Range):  Temp:  [99.3 °F (37.4 °C)-102.5 °F (39.2 °C)] 99.3 °F (37.4 °C)  Pulse:  [] 89  Resp:  [21-38] 25  SpO2:  [97 %-100 %] 100 %  BP: ()/(45-89) 114/64     Weight: 39.5 kg (87 lb 1.3 oz)  Body mass index is 18.2  kg/m².    SpO2: 100 %  O2 Device (Oxygen Therapy): BiPAP      Intake/Output Summary (Last 24 hours) at 09/06/17 1413  Last data filed at 09/06/17 1100   Gross per 24 hour   Intake           1777.5 ml   Output              115 ml   Net           1662.5 ml       Lines/Drains/Airways     Drain                 Urethral Catheter 09/06/17 0517 Latex 16 Fr. less than 1 day          Pressure Ulcer                 Pressure Ulcer 09/06/17 0600  buttocks Stage II less than 1 day          Peripheral Intravenous Line                 Peripheral IV - Single Lumen 09/06/17 0203 Right Forearm less than 1 day         Peripheral IV - Single Lumen 09/06/17 0203 Right Hand less than 1 day                Physical Exam   Constitutional: He appears lethargic. He appears cachectic. He appears ill.   Withdraws to pain    Eyes: Right eye exhibits no discharge. Left eye exhibits no discharge.   Cardiovascular: Regular rhythm.  Tachycardia present.  Exam reveals no gallop.    No murmur heard.  Pulmonary/Chest: Tachypnea noted. He has decreased breath sounds.   Abdominal: Bowel sounds are normal. There is tenderness.   Musculoskeletal: He exhibits no edema.   Neurological: He appears lethargic. He displays atrophy.   Skin: Skin is warm and dry.       Significant Labs:   ABG:   Recent Labs  Lab 09/06/17  0152   PH 7.413   PCO2 29.5*   HCO3 18.8*   POCSATURATED 100   BE -6   , Blood Culture: No results for input(s): LABBLOO in the last 48 hours., BMP:   Recent Labs  Lab 09/06/17 0139   *      K 4.5      CO2 17*   BUN 51*   CREATININE 1.1   CALCIUM 8.6*   MG 2.0   , CMP   Recent Labs  Lab 09/06/17  0139      K 4.5      CO2 17*   *   BUN 51*   CREATININE 1.1   CALCIUM 8.6*   PROT 7.3   ALBUMIN 2.8*   BILITOT 0.4   ALKPHOS 98   AST 91*   ALT 74*   ANIONGAP 13   ESTGFRAFRICA >60   EGFRNONAA >60   , CBC   Recent Labs  Lab 09/06/17  0139   WBC 1.61*   HGB 11.0*   HCT 33.7*      , INR   Recent Labs  Lab  09/06/17  0139   INR 1.0   , Lipid Panel No results for input(s): CHOL, HDL, LDLCALC, TRIG, CHOLHDL in the last 48 hours., Troponin   Recent Labs  Lab 09/06/17  0139   TROPONINI 0.010    and All pertinent lab results from the last 24 hours have been reviewed.    Significant Imaging: Echocardiogram:   2D echo with color flow doppler:   Results for orders placed or performed during the hospital encounter of 09/06/17   2D echo with color flow doppler   Result Value Ref Range    EF 60 55 - 65    Diastolic Dysfunction Yes (A)     Aortic Valve Regurgitation MILD     Est. PA Systolic Pressure 21.49     Pericardial Effusion NONE     Tricuspid Valve Regurgitation MILD     and X-Ray: CXR: X-Ray Chest 1 View (CXR): No results found for this visit on 09/06/17.

## 2017-09-06 NOTE — ASSESSMENT & PLAN NOTE
Related to (etiology):   NPO    Signs and Symptoms (as evidenced by):   BMI of 17- intubated on vent    Interventions/Recommendations (treatment strategy):  1. Consider nutritional support: TF of Impact Peptide 1.5 at 35ml/hr or PPN: Clinimix 4.25/10 at 100ml/hr    Nutrition Diagnosis Status:   Continues

## 2017-09-06 NOTE — ED NOTES
Let ICU nurse know Lab called for us to reorder Gamma lab level and draw more blood. Nurse said okay

## 2017-09-06 NOTE — PROGRESS NOTES
Dr. Lau notified of pt VS as below and UOP continues to be low 10cc/hr. MD orders NS 1 L Bolus to be given now, orders implemented, care is ongoing will continue to monitor pt status.      09/06/17 1200   Vital Signs   Pulse 91   Resp (!) 28   SpO2 100 %   BP (!) 85/52   MAP (mmHg) 64

## 2017-09-06 NOTE — CHAPLAIN
Patient was listed as no Catholic in EPIC.  Family asked nurse about calling a  to anoint patient.   [employer] Had already left for the day. Family practice [facility] at [facility].  [facility] agreed to make hospital visit if family member can give him a ride. Male family member went to  [facility]. Support offered to family members, daughter in law of patient translated for me.

## 2017-09-07 NOTE — PLAN OF CARE
Problem: Patient Care Overview  Goal: Plan of Care Review  Pt remains on BIPAP +10  /+ 5  FiO2 1.0    With SpO2  94 %.  No apparent respiratory distress noted.  Will cont to monitor.

## 2017-09-07 NOTE — PROGRESS NOTES
Dr. Huggins called and notified pt tachypneic and tachycardic, Breath sounds coarse, pt moaning, appear to be in pain, MD verbalizes understanding states will be up to assess pt, MD at bedside to assess pt, states will place orders, care is ongoing will continue to monitor pt status  Vitals:    09/07/17 0700   BP: 138/64   Pulse: 108   Resp: (!) 30   Temp:

## 2017-09-07 NOTE — SUBJECTIVE & OBJECTIVE
Past Medical History:   Diagnosis Date    Coronary artery disease     DVT (deep venous thrombosis)     Hypertension     Prostate disease     Stroke        History reviewed. No pertinent surgical history.    Review of patient's allergies indicates:  No Known Allergies    Medications:  Prescriptions Prior to Admission   Medication Sig    aspirin 81 MG Chew Take 1 tablet (81 mg total) by mouth once daily.    cetirizine (ZYRTEC) 10 MG tablet Take 1 tablet (10 mg total) by mouth 2 (two) times daily.    clopidogrel (PLAVIX) 75 mg tablet Take 1 tablet (75 mg total) by mouth once daily.    cyanocobalamin (VITAMIN B-12) 500 MCG tablet Take 500 mcg by mouth once daily.    levothyroxine (SYNTHROID) 100 mcg injection Inject 12.5 mcg into the vein once daily.    losartan (COZAAR) 50 MG tablet Take 1 tablet (50 mg total) by mouth once daily.    potassium phosphate, monobasic, (K-PHOS) 500 mg TbSO Take 1 tablet (500 mg total) by mouth once daily.    tamsulosin (FLOMAX) 0.4 mg Cp24 Take 0.4 mg by mouth once daily.    triamcinolone acetonide 0.1% (KENALOG) 0.1 % cream Apply topically 2 (two) times daily. Apply to affected areas.     Antibiotics     Start     Stop Route Frequency Ordered    09/07/17 1745  piperacillin-tazobactam 4.5 g in dextrose 5 % 100 mL IVPB (ready to mix system)      -- IV Every 8 hours (non-standard times) 09/07/17 1639    09/07/17 0300  vancomycin 750 mg in dextrose 5 % 250 mL IVPB (ready to mix system)  (Vancomycin IVPB with levels panel)      -- IV Every 24 hours (non-standard times) 09/06/17 0621    09/06/17 0515  ciprofloxacin (CIPRO)400mg/200ml D5W IVPB 400 mg      -- IV Every 12 hours (non-standard times) 09/06/17 0406        Antifungals     None        Antivirals     None             There is no immunization history on file for this patient.    Family History     None        Social History     Social History    Marital status:      Spouse name: N/A    Number of children: N/A     Years of education: N/A     Social History Main Topics    Smoking status: Never Smoker    Smokeless tobacco: Never Used    Alcohol use No    Drug use: No    Sexual activity: Not Asked     Other Topics Concern    None     Social History Narrative    None     Review of Systems   Unable to perform ROS: Intubated     Objective:     Vital Signs (Most Recent):  Temp: 97.7 °F (36.5 °C) (09/07/17 1600)  Pulse: (!) 118 (09/07/17 1630)  Resp: (!) 27 (09/07/17 1630)  BP: 106/63 (09/07/17 1600)  SpO2: 98 % (09/07/17 1630) Vital Signs (24h Range):  Temp:  [97.7 °F (36.5 °C)-99.4 °F (37.4 °C)] 97.7 °F (36.5 °C)  Pulse:  [] 118  Resp:  [23-39] 27  SpO2:  [75 %-100 %] 98 %  BP: ()/(53-81) 106/63     Weight: 39.5 kg (87 lb 1.3 oz)  Body mass index is 18.2 kg/m².    Estimated Creatinine Clearance: 31.8 mL/min (based on SCr of 1 mg/dL).    Physical Exam   Constitutional:   Intubated; frail   HENT:   Head: Normocephalic and atraumatic.   Periorbital edema   Eyes: Conjunctivae are normal. Pupils are equal, round, and reactive to light.   Cardiovascular: Intact distal pulses.    Heart sounds difficult to appreciate over vent   Pulmonary/Chest: Breath sounds normal.   Tachypneic; intubated   Abdominal: Soft.   Hypoactive bowel sounds   Musculoskeletal:   Contracted upper and lower extremities with increased tone   Neurological:   sedated   Skin:   Stage 2 decubitus ulcer on buttocks and R hip without surrounding erythema or drainage       Significant Labs:   CBC:   Recent Labs  Lab 09/06/17  0139 09/07/17  0155 09/07/17  0728   WBC 1.61* 16.69* 16.31*   HGB 11.0* 10.2* 10.3*   HCT 33.7* 31.1* 31.5*    169 155     Hepatitis Panel: No results for input(s): HEPBSAG, HEPAIGM, HEPCAB in the last 48 hours.    Invalid input(s): HAPBIGM  Lactic Acid:   Recent Labs  Lab 09/07/17  1049 09/07/17  1242 09/07/17  1602   LACTATE 4.4* 4.7* 3.6*     Microbiology Results (last 7 days)     Procedure Component Value Units Date/Time     Culture, Respiratory with Gram Stain [[id number]]     Order Status:  No result Specimen:  Respiratory from Endotracheal Aspirate     Culture, Respiratory [[id number]] Collected:  09/06/17 0900    Order Status:  Completed Specimen:  Respiratory from Sputum, Expectorated Updated:  09/07/17 1035     Respiratory Culture Further report to follow     Gram Stain (Respiratory) Moderate Gram positive cocci     Gram Stain (Respiratory) ModerateGram positive rods     Gram Stain (Respiratory) Few budding yeast     Gram Stain (Respiratory) No WBC's    Urine culture [[id number]] Collected:  09/06/17 0202    Order Status:  Completed Specimen:  Urine from Urine, Catheterized Updated:  09/07/17 1018     Urine Culture, Routine No growth    Blood culture x two cultures. Draw prior to antibiotics. [[id number]] Collected:  09/06/17 0140    Order Status:  Completed Specimen:  Blood from Peripheral, Forearm, Right Updated:  09/07/17 0954     Blood Culture, Routine Gram stain aer bottle: Gram positive cocci in clusters resembling Staph      Blood Culture, Routine Results called to and read back by:Briana Ayala RN 09/07/2017  09:53    Narrative:       Aerobic and anaerobic    Blood culture x two cultures. Draw prior to antibiotics. [[id number]] Collected:  09/06/17 0140    Order Status:  Completed Specimen:  Blood from Peripheral, Hand, Right Updated:  09/07/17 0812     Blood Culture, Routine No Growth to date     Blood Culture, Routine No Growth to date    Narrative:       Aerobic and anaerobic    Urine culture [[id number]] Collected:  09/07/17 0158    Order Status:  No result Specimen:  Urine from No method given Updated:  09/07/17 0359    Urine culture [[id number]]     Order Status:  Completed Specimen:  Urine from Urine, Catheterized     Urine culture [[id number]]     Order Status:  Canceled Specimen:  Urine from Urine, Catheterized         Procalcitonin:   Recent Labs  Lab 09/06/17  0139 09/07/17  1049   PROCAL 0.38* 39.29*       Significant  Imaging:   CXR, 9/7:   There is extensive abnormal scattered groundglass opacity concerning for infection.  There is no pneumothorax or pleural fluid identified.  The cardiac silhouette appears enlarged.  There is calcification of the aorta.  The osseous structures demonstrate degenerative change.  There is a fracture of the right sixth rib of indeterminate age.    Xray abd:  9/6-with possible fecal impaction  9/7-There are numerous air-filled prominent loops of bowel scattered throughout the abdomen.  There is calcification of the aorta.

## 2017-09-07 NOTE — PLAN OF CARE
Problem: Patient Care Overview  Goal: Plan of Care Review  Outcome: Ongoing (interventions implemented as appropriate)  Received pt on BIPAP 10/5 80%. Will cont to monitor.

## 2017-09-07 NOTE — PROGRESS NOTES
Dr. Huggins notified pt continues to be tachypneic and now requiring 100% Fio2 on bipap. md verbalizes understanding, states will be rounding with team soon.

## 2017-09-07 NOTE — CARE UPDATE
Spoke to son over phone and at bedside.  Explained that his father is critically ill and advised that we try to keep him as comfortable as possible.  Son was in agreement, stated that the family is very much aware of his declining health over last year which became more severe over these past 6 months.  Wished that his father be intubated to give him a trial of support in setting of pneumonia, volume overload and kidney failure however would not want to proceed with any invasive procedures beyond this which might be painful or uncomfortable for the patient.  Discussed that there will be no initiation of medications for blood pressure (vasopressors).  We will continue to support him with antibiotics for underlying infection but in the next 24hrs we will have a better idea of his course but prognosis is poor at this time.  Patient remains DNR - no chest compressions, inotropes or vasopressors.    Full consult note to follow.    Judy Espinosa MD

## 2017-09-07 NOTE — PLAN OF CARE
Notified Dr. White about the low mag level and high WBC count of 16. Repeat labs ordered, will replace mag.

## 2017-09-07 NOTE — PHYSICIAN QUERY
"PT Name: Simeon Marvin  MR #: [id number]    Physician Query Form - Nutrition Clarification     CDS/: Blanca Bowden RN CDI  Contact information: [phone]    This form is a permanent document in the medical record.     Query Date: September 7, 2017    By submitting this query, we are merely seeking further clarification of documentation.. Please utilize your independent clinical judgment when addressing the question(s) below.    The Medical record contains the following:   Indicators  Supporting Clinical Findings Location in Medical Record   X % of Estimated Energy Intake over a time frame from p.o., TF, or TPN Energy Need Method: Kcal/kg (0462-2610 (35-40 kcal/kg))  RMR ([place]-St. Jeor Equation): 910.75  Weight Used For Protein Calculations: 39.5 kg (87 lb 1.3 oz)  Protein Requirements: 47g (1.2 g/kg)  Fluid Need Method:  (1ml/kcal) RD consult 9/6    Weight Status over a time frame      Subcutaneous Fat and/or Muscle Loss      Fluid Accumulation or Edema      Reduced  Strength     X Wt / BMI / Usual Body Weight Height: 4' 10" (147.3 cm)  Weight Method: Bed Scale  Weight: 39.5 kg (87 lb 1.3 oz)  Ideal Body Weight (IBW), Male: 94 lb  % Ideal Body Weight, Male (lb): 92.64 lb  BMI (Calculated): 18.2  BMI Grade: 17 - 18.4 protein-energy malnutrition grade I RD consult 9/6   X Delayed Wound Healing / Failure to Thrive Chief complaint: SOB and failure to thrive  sacral decubitus ulcer stage 2  - wound care  H&P   X Acute or Chronic Illness hx of previous CVA with residual weakness and contractures, Aortic Valve Endocarditis (treated with 1 month rocephin in July), HTN, BPH BIBA from [facility] for SOB and failure to thrive. hypothyroidism H&P    Medication     X Treatment Goals:   Diet or TF will be started within 24 hours  Nutrition Goal Status: new  Communication of RD Recs: reviewed with RN RD consult 9/6   X Other Very thin, contracted, cachectic  H&P     AND / ASPEN Clinical Characteristics " (October 2011)  A minimum of two characteristics is recommended for diagnosing either moderate or severe malnutrition   Mild Malnutrition Moderate Malnutrition Severe Malnutrition   Energy Intake from p.o., TF or TPN. < 75% intake of estimated energy needs for less than 7 days < 75% intake of estimated energy needs for greater than 7 days < 50% intake of estimated energy needs for > 5 days   Weight Loss 1-2% in 1 month  5% in 3 months  7.5% in 6 months  10% in 1 year 1-2 % in 1 week  5% in 1 month  7.5% in 3 months  10% in 6 months  20% in 1 year > 2% in 1 week  > 5% in 1 month  > 7.5% in 3 months  > 10% in 6 months  > 20% in 1 year   Physical Findings     None *Mild subcutaneous fat and/or muscle loss  *Mild fluid accumulation  *Stage II decubitus  *Surgical wound or non-healing wound *Mod/severe subcutaneous fat and/or muscle loss  *Mod/severe fluid accumulation  *Stage III or IV decubitus  *Non-healing surgical wound     Provider, please specify diagnosis or diagnoses associated with above clinical findings.    [ ] Mild Protein-Calorie Malnutrition  [ ] Moderate Protein-Calorie Malnutrition  [x ] Severe Protein-Calorie Malnutrition  [ ] Other Nutritional Diagnosis (please specify): ____________________________________  [ ] Other: ________________________________  [ ] Clinically Undetermined    Please document in your progress notes daily for the duration of treatment until resolved and include in your discharge summary.

## 2017-09-07 NOTE — CONSULTS
[facility]  Infectious Disease  Consult Note    Patient Name: Simeon Marvin  MRN: [id number]  Admission Date: 9/6/2017  Hospital Length of Stay: 1 days  Attending Physician: Rick Siegel MD  Primary Care Provider: Ever Price MD     Isolation Status: No active isolations    Patient information was obtained from past medical records and ER records.      Inpatient consult to Infectious Diseases  Consult performed by: KARELY DELONG  Consult ordered by: MIGUEL IZAGUIRRE  Reason for consult: sepsis due to suspected pneumonia        Assessment/Plan:     * Sepsis due to pneumonia    -Pt with declining functional status and shortness of breath on presentation; temp 102  -  H/o aortic valve endocarditis 7/2016 with s. Epi + blood cultures; treated with 1 month rocephin and was supposed to have repeat echo, which is not in system; no aortic regur noted at that time  -Now with CXR with diffuse opacities, abd xray with fecal impaction and ileus, TTE with aortic regurg, WBC 1.6->16; persistent elevated lactate (2.8->4.7), procal 0.38->40; initially requiring BiPAP but intubated on 9/7  -Possible source PNA v intraabdominal process v recurrent endocarditis  -Cultures-9/6: Blood: gram stain with G+ cocci resembling staph; NGTD. Respiratory (induced sputum): NGTD. Urine: NGTD  9/7: Urine: pending; Respiratory (ET tube): pending   -Abx:  -off cefepime (9/6-9/7); Cipro (9/6-present); vanc (9/6-present); zosyn (9/7-present)    -Recs:  --stopped cefepime; started zosyn; continue cipro and vanc  --ordered repeat respiratory cultures from ET tube  --ordered legionella antigen  --consider NAISH vs empirically committing to another course of IV abx for potential recurrent endocarditis  --will continue to follow cultures and deescalate abx accordingly        Sacral decubitus ulcer, stage II    -Pt with stage 2 decubitus ulcers on buttocks and R hip  -Pink healthy tissue with minimal fibrinous slough overlying;  no erythema, drainage, or other signs of infection            Thank you for your consult. I will follow-up with patient. Please contact us if you have any additional questions.    Drew Simmons MD  Infectious Disease  [facility]    Subjective:     Principal Problem: Sepsis due to pneumonia    HPI: 83 yo M with past h/o previous CVA, aortic valve endocarditis (6/17), chronic HCV (presumed untreated), HTN, BPH who is currently living at [facility]. The patient was sedated so all history was obtained from medical records. He was brought to the hospital for several days of decreased PO intake and shortness of breath. At baseline, he is non-verbal and requires assistance with ADLs.    7/16-episode of aortic endocarditis  -Blood cx, 7/12, 2/2 bottles in 1/1 set with methacillin sensitive S. Epi; 7/13, 2/2 bottles in 1/2 sets with methacillin resistant S. Epi  -Treated with 1 month ceftriaxone    Cultures:  9/6:  Blood: gram stain with G+ cocci resembling staph; NGTD  Respiratory (induced sputum): NGTD  Urine: NGTD    9/7:  Urine: pending  Respiratory (ET tube): pending    Abx:  -off cefepime (9/6-9/7)    -Cipro (9/6-present)  -vanc (9/6-present)  -zosyn (9/7-present)    Past Medical History:   Diagnosis Date    Coronary artery disease     DVT (deep venous thrombosis)     Hypertension     Prostate disease     Stroke        History reviewed. No pertinent surgical history.    Review of patient's allergies indicates:  No Known Allergies    Medications:  Prescriptions Prior to Admission   Medication Sig    aspirin 81 MG Chew Take 1 tablet (81 mg total) by mouth once daily.    cetirizine (ZYRTEC) 10 MG tablet Take 1 tablet (10 mg total) by mouth 2 (two) times daily.    clopidogrel (PLAVIX) 75 mg tablet Take 1 tablet (75 mg total) by mouth once daily.    cyanocobalamin (VITAMIN B-12) 500 MCG tablet Take 500 mcg by mouth once daily.    levothyroxine (SYNTHROID) 100 mcg injection  Inject 12.5 mcg into the vein once daily.    losartan (COZAAR) 50 MG tablet Take 1 tablet (50 mg total) by mouth once daily.    potassium phosphate, monobasic, (K-PHOS) 500 mg TbSO Take 1 tablet (500 mg total) by mouth once daily.    tamsulosin (FLOMAX) 0.4 mg Cp24 Take 0.4 mg by mouth once daily.    triamcinolone acetonide 0.1% (KENALOG) 0.1 % cream Apply topically 2 (two) times daily. Apply to affected areas.     Antibiotics     Start     Stop Route Frequency Ordered    09/07/17 1745  piperacillin-tazobactam 4.5 g in dextrose 5 % 100 mL IVPB (ready to mix system)      -- IV Every 8 hours (non-standard times) 09/07/17 1639    09/07/17 0300  vancomycin 750 mg in dextrose 5 % 250 mL IVPB (ready to mix system)  (Vancomycin IVPB with levels panel)      -- IV Every 24 hours (non-standard times) 09/06/17 0621    09/06/17 0515  ciprofloxacin (CIPRO)400mg/200ml D5W IVPB 400 mg      -- IV Every 12 hours (non-standard times) 09/06/17 0406        Antifungals     None        Antivirals     None             There is no immunization history on file for this patient.    Family History     None        Social History     Social History    Marital status:      Spouse name: N/A    Number of children: N/A    Years of education: N/A     Social History Main Topics    Smoking status: Never Smoker    Smokeless tobacco: Never Used    Alcohol use No    Drug use: No    Sexual activity: Not Asked     Other Topics Concern    None     Social History Narrative    None     Review of Systems   Unable to perform ROS: Intubated     Objective:     Vital Signs (Most Recent):  Temp: 97.7 °F (36.5 °C) (09/07/17 1600)  Pulse: (!) 118 (09/07/17 1630)  Resp: (!) 27 (09/07/17 1630)  BP: 106/63 (09/07/17 1600)  SpO2: 98 % (09/07/17 1630) Vital Signs (24h Range):  Temp:  [97.7 °F (36.5 °C)-99.4 °F (37.4 °C)] 97.7 °F (36.5 °C)  Pulse:  [] 118  Resp:  [23-39] 27  SpO2:  [75 %-100 %] 98 %  BP: ()/(53-81) 106/63     Weight: 39.5  kg (87 lb 1.3 oz)  Body mass index is 18.2 kg/m².    Estimated Creatinine Clearance: 31.8 mL/min (based on SCr of 1 mg/dL).    Physical Exam   Constitutional:   Intubated; frail   HENT:   Head: Normocephalic and atraumatic.   Periorbital edema   Eyes: Conjunctivae are normal. Pupils are equal, round, and reactive to light.   Cardiovascular: Intact distal pulses.    Heart sounds difficult to appreciate over vent   Pulmonary/Chest: Breath sounds normal.   Tachypneic; intubated   Abdominal: Soft.   Hypoactive bowel sounds   Musculoskeletal:   Contracted upper and lower extremities with increased tone   Neurological:   sedated   Skin:   Stage 2 decubitus ulcer on buttocks and R hip without surrounding erythema or drainage       Significant Labs:   CBC:   Recent Labs  Lab 09/06/17  0139 09/07/17  0155 09/07/17  0728   WBC 1.61* 16.69* 16.31*   HGB 11.0* 10.2* 10.3*   HCT 33.7* 31.1* 31.5*    169 155     Hepatitis Panel: No results for input(s): HEPBSAG, HEPAIGM, HEPCAB in the last 48 hours.    Invalid input(s): HAPBIGM  Lactic Acid:   Recent Labs  Lab 09/07/17  1049 09/07/17  1242 09/07/17  1602   LACTATE 4.4* 4.7* 3.6*     Microbiology Results (last 7 days)     Procedure Component Value Units Date/Time    Culture, Respiratory with Gram Stain [[id number]]     Order Status:  No result Specimen:  Respiratory from Endotracheal Aspirate     Culture, Respiratory [[id number]] Collected:  09/06/17 0900    Order Status:  Completed Specimen:  Respiratory from Sputum, Expectorated Updated:  09/07/17 1035     Respiratory Culture Further report to follow     Gram Stain (Respiratory) Moderate Gram positive cocci     Gram Stain (Respiratory) ModerateGram positive rods     Gram Stain (Respiratory) Few budding yeast     Gram Stain (Respiratory) No WBC's    Urine culture [[id number]] Collected:  09/06/17 0202    Order Status:  Completed Specimen:  Urine from Urine, Catheterized Updated:  09/07/17 1018     Urine Culture, Routine No  growth    Blood culture x two cultures. Draw prior to antibiotics. [[id number]] Collected:  09/06/17 0140    Order Status:  Completed Specimen:  Blood from Peripheral, Forearm, Right Updated:  09/07/17 0954     Blood Culture, Routine Gram stain aer bottle: Gram positive cocci in clusters resembling Staph      Blood Culture, Routine Results called to and read back by:Briana Ayala RN 09/07/2017  09:53    Narrative:       Aerobic and anaerobic    Blood culture x two cultures. Draw prior to antibiotics. [[id number]] Collected:  09/06/17 0140    Order Status:  Completed Specimen:  Blood from Peripheral, Hand, Right Updated:  09/07/17 0812     Blood Culture, Routine No Growth to date     Blood Culture, Routine No Growth to date    Narrative:       Aerobic and anaerobic    Urine culture [[id number]] Collected:  09/07/17 0158    Order Status:  No result Specimen:  Urine from No method given Updated:  09/07/17 0359    Urine culture [[id number]]     Order Status:  Completed Specimen:  Urine from Urine, Catheterized     Urine culture [[id number]]     Order Status:  Canceled Specimen:  Urine from Urine, Catheterized         Procalcitonin:   Recent Labs  Lab 09/06/17  0139 09/07/17  1049   PROCAL 0.38* 39.29*       Significant Imaging:   CXR, 9/7:   There is extensive abnormal scattered groundglass opacity concerning for infection.  There is no pneumothorax or pleural fluid identified.  The cardiac silhouette appears enlarged.  There is calcification of the aorta.  The osseous structures demonstrate degenerative change.  There is a fracture of the right sixth rib of indeterminate age.    Xray abd:  9/6-with possible fecal impaction  9/7-There are numerous air-filled prominent loops of bowel scattered throughout the abdomen.  There is calcification of the aorta.

## 2017-09-07 NOTE — SIGNIFICANT EVENT
Dr. MORENITA Espinosa and Dr. JULIO CÉSAR Cao at patient bedside for intubation. Patient was chemically sedated and paralyzed prior to procedure. Unable to intubate with direct laryngoscopy so glidescope was used. Intubated with 8.0 ET tube secured at 23cm at the lips. Placement confirmed by color change on EtCO2 detector and bilateral breath sounds on auscultation. Connected to ventilator; equal chest rise and fall, symmetrical chest movement. Patient tolerated procedure without complications. Awaiting chest x-ray.

## 2017-09-07 NOTE — PROCEDURES
[facility]  Endotracheal Intubation  Procedure Note    SUMMARY     Date of Procedure: 9/7/2017    Procedure: Intubation    Provider: Mandie Cao MD    Assisting Provider: Dr. Espinosa    Indication: respiratory failure.    Pre-Procedure Diagnosis: Hypoxic Respiratory Failure    Post-Procedure Diagnosis: Hypoxic Respiratory Failure    Anesthesia: Etomidate and Succinylcholine    Description of the Findings of the Procedure:     Sedation: etomidate.  Paralytic: succinylcholine.  Lidocaine: no.  Atropine: no.  Equipment: Masha 4 laryngoscope blade.  Cricoid Pressure: yes.  Number of attempts: 3.  ETT location confirmed by by auscultation, by CXR and ETCO2 monitor.    Significant Surgical Tasks Conducted by the Assistant(s), if Applicable: N/A    Complications: None; patient tolerated the procedure well.    Estimated Blood Loss (EBL): Minimal           Implants: N/A    Specimens: N/A       Condition: Critical        Disposition: ICU - intubated and critically ill.    Attestation: I was present and scrubbed for the entire procedure.

## 2017-09-07 NOTE — PLAN OF CARE
Dr. Espinosa and Dr. Cao aware pt hourly UOP 0-20cc/hr no new orders given at this time. Care is ongoing will continue to monitor pt status.

## 2017-09-07 NOTE — ASSESSMENT & PLAN NOTE
-Pt with stage 2 decubitus ulcers on buttocks and R hip  -Pink healthy tissue with minimal fibrinous slough overlying; no erythema, drainage, or other signs of infection

## 2017-09-07 NOTE — PROGRESS NOTES
Pt turned to right side sat decreased to 82% pox resp therapist at bedside fio2 increased to 100% after 4 minutes pt recovered to a pox of 90%. Care is ongoing will continue to monitor pt status.

## 2017-09-07 NOTE — PROGRESS NOTES
09/07/17 0330   Vital Signs   Pulse (!) 115   Resp (!) 37   SpO2 (!) 93 %   BP (!) 170/74   MAP (mmHg) 107     Notified Dr. White about the Vital signs as above, tachycardic between 115-120 and tachyphenic.

## 2017-09-07 NOTE — PROGRESS NOTES
Pulmonary/Critical care MD team at [facility] aware of pt status and vitals signs, md assessing pt and speaking with family

## 2017-09-07 NOTE — PROGRESS NOTES
Dr. Espinosa and Dr. Alejandre at pt bedside and aware of pt vitals signs and pt ABG results. No new orders given at this time. Will continue to monitor pt status.

## 2017-09-07 NOTE — PROGRESS NOTES
Progress Note  [facility]  Admit Date: 9/6/2017   LOS: 1 day   SUBJECTIVE:   Follow-up For: sepsis 2/2 pneumonia     Patient seen and examined this AM.  Patient on continuous bipap.  Appears distressed mild distress.  Fecal disimpaction yesterday.  Spoke to family and want patient JOSE BLANKENSHIP  Unable to assess due to condition   OBJECTIVE:   Vital Signs (Most Recent)  Temp: 99.4 °F (37.4 °C) (09/07/17 0729)  Pulse: (!) 151 (09/07/17 0847)  Resp: (!) 32 (09/07/17 0847)  BP: (!) 160/72 (09/07/17 0830)  SpO2: (!) 88 % (09/07/17 0847)    I & O (Last 24H):  Intake/Output Summary (Last 24 hours) at 09/07/17 0922  Last data filed at 09/07/17 0200   Gross per 24 hour   Intake             4135 ml   Output              340 ml   Net             3795 ml     Wt Readings from Last 3 Encounters:   09/06/17 39.5 kg (87 lb 1.3 oz)   08/02/17 49 kg (108 lb)   04/07/17 49 kg (108 lb)       Current Diet Order   Procedures    Diet NPO        Physical Exam   Constitutional:   On bip   Awake, not alert or oriented     HENT:   Head: Normocephalic and atraumatic.   Eyes: Right eye exhibits no discharge. Left eye exhibits no discharge.   Neck: Normal range of motion. Neck supple.   Cardiovascular:   Tachycardic, regular rhythm    Pulmonary/Chest: He is in respiratory distress. He has rales.   Abdominal: Soft. He exhibits no distension.   Musculoskeletal:   Contracted    Skin: Skin is warm. He is diaphoretic.         Laboratory Data:  CBC    Recent Labs  Lab 09/06/17 0139 09/07/17 0155 09/07/17 0728   WBC 1.61* 16.69* 16.31*   RBC 3.68* 3.40* 3.45*   HGB 11.0* 10.2* 10.3*   HCT 33.7* 31.1* 31.5*    169 155   MCV 92 92 91   MCH 29.9 30.0 29.9   MCHC 32.6 32.8 32.7     CMP    Recent Labs  Lab 09/06/17  0139 09/07/17  0155   CALCIUM 8.6* 7.4*   PROT 7.3 5.9*    139   K 4.5 4.5   CO2 17* 16*    114*   BUN 51* 42*   CREATININE 1.1 1.0   ALKPHOS 98 60   ALT 74* 49*   AST 91* 77*   BILITOT 0.4 0.5      POCT-Glucose  POCT Glucose   Date Value Ref Range Status   09/07/2017 74 70 - 110 mg/dL Final   09/06/2017 99 70 - 110 mg/dL Final   09/06/2017 59 (L) 70 - 110 mg/dL Final   09/06/2017 92 70 - 110 mg/dL Final   09/06/2017 85 70 - 110 mg/dL Final     COAGS    Recent Labs  Lab 09/06/17  0139   INR 1.0   APTT 27.9     UA    Recent Labs  Lab 09/06/17  2248   COLORU DK. BROWN   SPECGRAV 1.025   PHUR 6.0   PROTEINUA 2+*   BACTERIA Rare     MICRO  Microbiology Results (last 7 days)     Procedure Component Value Units Date/Time    Blood culture x two cultures. Draw prior to antibiotics. [[id number]] Collected:  09/06/17 0140    Order Status:  Completed Specimen:  Blood from Peripheral, Hand, Right Updated:  09/07/17 0812     Blood Culture, Routine No Growth to date     Blood Culture, Routine No Growth to date    Narrative:       Aerobic and anaerobic    Blood culture x two cultures. Draw prior to antibiotics. [[id number]] Collected:  09/06/17 0140    Order Status:  Completed Specimen:  Blood from Peripheral, Forearm, Right Updated:  09/07/17 0812     Blood Culture, Routine No Growth to date     Blood Culture, Routine No Growth to date    Narrative:       Aerobic and anaerobic    Urine culture [[id number]] Collected:  09/07/17 0158    Order Status:  No result Specimen:  Urine from No method given Updated:  09/07/17 0359    Urine culture [[id number]]     Order Status:  Completed Specimen:  Urine from Urine, Catheterized     Urine culture [[id number]]     Order Status:  Canceled Specimen:  Urine from Urine, Catheterized     Culture, Respiratory [[id number]] Collected:  09/06/17 0900    Order Status:  Completed Specimen:  Respiratory from Sputum, Expectorated Updated:  09/06/17 1449     Gram Stain (Respiratory) Moderate Gram positive cocci     Gram Stain (Respiratory) ModerateGram positive rods     Gram Stain (Respiratory) Few budding yeast     Gram Stain (Respiratory) No WBC's    Urine culture [[id number]] Collected:  09/06/17  0202    Order Status:  Sent Specimen:  Urine from Urine, Catheterized Updated:  09/06/17 0510        LIPID PANEL  Lab Results   Component Value Date    CHOL 114 (L) 07/13/2016     Lab Results   Component Value Date    HDL 38 (L) 07/13/2016     Lab Results   Component Value Date    LDLCALC 63.4 07/13/2016     Lab Results   Component Value Date    TRIG 63 07/13/2016     Lab Results   Component Value Date    CHOLHDL 33.3 07/13/2016       Diagnostic Results:  Imaging in last 24 hours:  Abd X ray with colon impaction     ASSESSMENT/PLAN:   81 yo male with hx of CVA, HTN, BPH, hypothyroidism presented from nursing facility with SOB and FTT and found to have sepsis 2/2 pna.       Neuro  - wakes to pain  - non verbal, non mobile at baseline   - contractures in upper and lower extremities  - no family or nursing home staff to verify if patient at or below baseline.   - previous CVA: home meds: asa and plavix continued     [facility]  - Hx of HTN : home medications: cozaar 50mg continued  -had hypotension yesterday, gave IV bolus, improved bp now     Resp  - RUL infiltrate concerning for PNA on CXR  - Requiring BiPAP: wean as tolerating   -ABG today stat  -worse lung sounds this AM, f/u CXR  -broad spectrum abx   -resp cultures with G positive   -prelim blood cultures NGTD    FEN/GI  - Fluids: 30cc/kg per sepsis protocols given in ED, continued IVF @ 50cc/hr.   - Electrolytes: will continue to monitor and replete PRN   - NPO for now: patient cacethic with recent decrease in po intake consider nutrition consult   - Elevated LFTs/transaminitis : possibly 2/2 sepsis vs obstruction vs Chronic HEP C  will continue to monitor consider US, GGT, Hep viral load.   -will need TPN vs Peg tube, discuss with family   - Lipase elevated at 120, IVF, NPO     Renal  - on admission Bun/cr 42/1 improved from yesterday   - baseline from 2016 B/un 11/ cr 1.0  - IVF  - Will continue to monitor  -low urine output     ID  Sepsis 2/2 PNA vs Endocarditis    - On admission WBC 1.6, Temp 101.9, HR 11, RR 23  - Source PNA seen on CXR vs reoccurrence of endocarditis (treated in July with 1 month iv abx)   - IVF : 30cc/kg given in ED  - IVabx: Vanc and cefepime in ED  Will continue and add Cipro   - Will trend LA Q 4hours  -consulted ID due to previous h/o endocarditis      MSK  - sacral decubitus ulcer stage 2  - wound care      Endocrine  -TSH WNL     Glucose 97     Code: full  Diet: npo  Ppx: pepcid, lovenox  Dispo: f/u cultures, f/u cardio, DNR may need TPN    9/7/2017 Brian Huggins MD  9:22 AM

## 2017-09-07 NOTE — PLAN OF CARE
09/06/17 1903   Discharge Assessment   Assessment Type Discharge Planning Assessment   Confirmed/corrected address and phone number on facesheet? Yes   Assessment information obtained from? Medical Record   Expected Length of Stay (days) 3   Communicated expected length of stay with patient/caregiver no   Prior to hospitilization cognitive status: Unable to Assess   Current cognitive status: Unable to Assess   Able to Return to Prior Arrangements yes   Is patient able to care for self after discharge? No   Patient's perception of discharge disposition nursing home   Readmission Within The Last 30 Days no previous admission in last 30 days   Patient currently being followed by outpatient case management? No   Patient currently receives any other outside agency services? No   Is it the patient/care giver preference to resume care with the current outside agency? Yes   Do you have any problems affording any of your prescribed medications? No   Is the patient taking medications as prescribed? yes   Does the patient have transportation home? Yes

## 2017-09-07 NOTE — ASSESSMENT & PLAN NOTE
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 Epi + blood cultures; treated with 1 month rocephin and was supposed to have repeat echo, which is not in system; no aortic regur noted at that time  -Now with CXR with diffuse opacities, abd xray with fecal impaction and ileus, TTE with aortic regurg, WBC 1.6->16; persistent elevated lactate (2.8->4.7), procal 0.38->40; initially requiring BiPAP but intubated on 9/7  -Possible source PNA v intraabdominal process v recurrent endocarditis  -Cultures-9/6: Blood: gram stain with G+ cocci resembling staph; NGTD. Respiratory (induced sputum): NGTD. Urine: NGTD  9/7: Urine: pending; Respiratory (ET tube): pending   -Abx:  -off cefepime (9/6-9/7); Cipro (9/6-present); vanc (9/6-present); zosyn (9/7-present)    -Recs:  --stopped cefepime; started zosyn; continue cipro and vanc  --ordered repeat respiratory cultures from ET tube  --ordered legionella antigen  --consider ANISH vs empirically committing to another course of IV abx for potential recurrent endocarditis  --will continue to follow cultures and deescalate abx accordingly

## 2017-09-07 NOTE — PLAN OF CARE
Notified Dr. White about the blood tinged urine output. No new orders given. Will continue to monitor.

## 2017-09-08 NOTE — PLAN OF CARE
Problem: Patient Care Overview  Goal: Plan of Care Review  Outcome: Ongoing (interventions implemented as appropriate)  Pt hemodynamically unstable throughout shift with variations in BP and blood glucose. MAP <60 at current. Primary and pulmonary (Dr. Cao) teams aware. Fentanyl gtt remains infusing for comfort. Family updated per primary and pulmonary teams regarding DNR status and future plan of care. Pt appears comfortable at this time. Family to reconvene tomorrow at the bedside to speak with physicians.

## 2017-09-08 NOTE — PROGRESS NOTES
Pt. Received on vent with settings on the flow sheet. Pt. Sleeping in no apparent distress.  Vent check ok, alarms working and are audible. Vent volumes ok.

## 2017-09-08 NOTE — PROGRESS NOTES
09/07/17 2015   Vital Signs   Pulse (!) 118   Resp (!) 22   Resp Rate Total 44 br/min   SpO2 (!) 94 %   Oxygen Concentration (%) 100   BP (!) 66/44   MAP (mmHg) 51     Notified Dr. White about the vital signs above, 250 ml of bolus ordered. Orders implemented. Will continue to monitor.

## 2017-09-08 NOTE — ASSESSMENT & PLAN NOTE
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 Epi + blood cultures; treated with 1 month rocephin and was supposed to have repeat echo, which is not in system; no aortic regur noted at that time  -Now with CXR with diffuse opacities, abd xray with fecal impaction and ileus, TTE with aortic regurg, WBC 1.6->16; persistent elevated lactate (2.8->4.7), procal 0.38->40; initially requiring BiPAP but intubated on 9/7  -Possible source PNA v intraabdominal process v recurrent endocarditis  -Cultures-9/6: Blood: gram stain with G+ cocci resembling staph; NGTD. Respiratory (induced sputum): s. aureus. Urine: NGTD  9/7: Urine: pending; Respiratory (ET tube): pending   -Abx:  -off cefepime (9/6-9/7); off Cipro (9/6-9/8); vanc (9/6-present); zosyn (9/7-present)    -Recs:  --Resp cx with S. Aureus--stopped cipro; continue zosyn and vanc  --f/u repeat respiratory cultures from ET tube and legionella antigen  --consider ANISH vs empirically committing to another course of IV abx for potential recurrent endocarditis; ANISH will likely not be possible 2/2 patient's condition  --will continue to follow cultures and deescalate abx accordingly

## 2017-09-08 NOTE — PROGRESS NOTES
Dr. Huggins notified that patient is beginning to have a marked decrease in blood pressure, and had a 30 to 60 second period where pulses were not palpable, pt was synchronous with the ventilator and BP and SpO2 were unable to be obtained. Pt recovered with return of pulses, BP and SpO2, and began to have an increase in intrinsic respiratory rate. Pt minimally responsive, and only to vigorous stimulation. No new orders obtained. Nursing request for family to be notified, which Dr. Huggins agreed to contact them. RN to the bedside to monitor.

## 2017-09-08 NOTE — SUBJECTIVE & OBJECTIVE
Interval History: pt remains intubated and sedated    Review of Systems  Objective:     Vital Signs (Most Recent):  Temp: 97.5 °F (36.4 °C) (09/08/17 1129)  Pulse: (!) 120 (09/08/17 1450)  Resp: (!) 41 (09/08/17 1141)  BP: (!) 67/43 (09/08/17 1450)  SpO2: 96 % (09/08/17 1450) Vital Signs (24h Range):  Temp:  [97.5 °F (36.4 °C)-98.5 °F (36.9 °C)] 97.5 °F (36.4 °C)  Pulse:  [] 120  Resp:  [20-41] 41  SpO2:  [80 %-100 %] 96 %  BP: ()/(40-87) 67/43     Weight: 43.5 kg (95 lb 14.4 oz)  Body mass index is 20.04 kg/m².    Estimated Creatinine Clearance: 23.4 mL/min (based on SCr of 1.5 mg/dL (H)).    Physical Exam  Constitutional:   Intubated; frail   HENT:   Head: Normocephalic and atraumatic.   Periorbital edema   Eyes: Conjunctivae are normal. Pupils are equal, round, and reactive to light.   Cardiovascular: Intact distal pulses.    Heart sounds difficult to appreciate over vent   Pulmonary/Chest: Breath sounds normal.   Tachypneic; intubated with diffusely coarse breath sounds  Abdominal: Soft.   Hypoactive bowel sounds   Musculoskeletal:   Contracted upper and lower extremities with increased tone   Neurological:   sedated   Skin:   Stage 2 decubitus ulcer on buttocks and R hip without surrounding erythema or drainage   Significant Labs:   CBC:   Recent Labs  Lab 09/07/17  0155 09/07/17  0728 09/08/17  0335   WBC 16.69* 16.31* 11.43   HGB 10.2* 10.3* 9.5*   HCT 31.1* 31.5* 27.9*    155 126*     Lactic Acid:   Recent Labs  Lab 09/08/17  0335 09/08/17  0836 09/08/17  1225   LACTATE 3.3* 3.8* 2.8*     Microbiology Results (last 7 days)     Procedure Component Value Units Date/Time    Culture, Respiratory [[id number]] Collected:  09/06/17 0900    Order Status:  Completed Specimen:  Respiratory from Sputum, Expectorated Updated:  09/08/17 1012     Respiratory Culture --     STAPHYLOCOCCUS AUREUS  Moderate  Susceptibility pending  Normal respiratory bhavesh also present       Gram Stain (Respiratory)  Moderate Gram positive cocci     Gram Stain (Respiratory) ModerateGram positive rods     Gram Stain (Respiratory) Few budding yeast     Gram Stain (Respiratory) No WBC's    Blood culture x two cultures. Draw prior to antibiotics. [[id number]] Collected:  09/06/17 0140    Order Status:  Completed Specimen:  Blood from Peripheral, Forearm, Right Updated:  09/08/17 0936     Blood Culture, Routine Gram stain aer bottle: Gram positive cocci in clusters resembling Staph      Blood Culture, Routine Results called to and read back by:Briana Ayala RN 09/07/2017  09:53     Blood Culture, Routine --     COAGULASE-NEGATIVE STAPHYLOCOCCUS SPECIES  Organism is a probable contaminant      Narrative:       Aerobic and anaerobic    Blood culture x two cultures. Draw prior to antibiotics. [[id number]] Collected:  09/06/17 0140    Order Status:  Completed Specimen:  Blood from Peripheral, Hand, Right Updated:  09/08/17 0812     Blood Culture, Routine No Growth to date     Blood Culture, Routine No Growth to date     Blood Culture, Routine No Growth to date    Narrative:       Aerobic and anaerobic    Urine culture [[id number]] Collected:  09/07/17 0158    Order Status:  Completed Specimen:  Urine from No method given Updated:  09/08/17 0648     Urine Culture, Routine No growth    Culture, Respiratory with Gram Stain [[id number]]     Order Status:  No result Specimen:  Respiratory from Endotracheal Aspirate     Urine culture [[id number]] Collected:  09/06/17 0202    Order Status:  Completed Specimen:  Urine from Urine, Catheterized Updated:  09/07/17 1018     Urine Culture, Routine No growth    Urine culture [[id number]]     Order Status:  Completed Specimen:  Urine from Urine, Catheterized     Urine culture [[id number]]     Order Status:  Canceled Specimen:  Urine from Urine, Catheterized           Significant Imaging: None

## 2017-09-08 NOTE — HPI
81 yo M with past h/o previous CVA, aortic valve endocarditis (6/17), chronic HCV (presumed untreated), HTN, BPH who is currently living at [facility]. The patient was sedated so all history was obtained from medical records. He was brought to the hospital for several days of decreased PO intake and shortness of breath. At baseline, he is non-verbal and requires assistance with ADLs.    7/16-episode of aortic endocarditis  -Blood cx, 7/12, 2/2 bottles in 1/1 set with methacillin sensitive S. Epi; 7/13, 2/2 bottles in 1/2 sets with methacillin resistant S. Epi  -Treated with 1 month ceftriaxone    Cultures:  9/6:  Blood: gram stain with G+ cocci resembling staph; NGTD  Respiratory (induced sputum): S. aureus  Urine: NGTD    9/7:  Urine: NGTD  Respiratory (ET tube): pending    Abx:  -off cefepime (9/6-9/7)    -Cipro (9/6-present)  -vanc (9/6-present)  -zosyn (9/7-present)  
Simeon Marvin is an 82 y.o.  male with hx of previous CVA with residual weakness, he is contracted, Aortic Valve Endocarditis, HTN, BPH from [facility] for SOB and failure to thrive. HPI retrieved from chart as patient is nonverbal and minimally responsive.  EMS was notified that patient has had several days of decreased PO intake and decreased urine output. He was noted to be SOB by nursing home staff thus EMS was called.   Regarding Aortic valve endocarditis: Chart review notes patient was hospitalized in 7/16 with an ischemic stroke. Even prior to the stroke pt had been institutionalized at nursing home due to extremely poor functional status and inability of family to care for him at home anymore. LVEF was noted normal.  ANISH showed an aortic valve vegetation. Blood culture was positive for staph epi.  Patient received one month of IV ceftriaxone after developing a rash from vancomycin.   Current hospitalization: Blood, urine, and sputum cultures are in process  TTE today:  CONCLUSIONS     1 - Normal left ventricular systolic function (EF 60-65%).     2 - Wall motion abnormalities. The following segments were hypokinetic: basal inferolateral wall    3 - Concentric remodeling.     4 - Impaired LV relaxation, normal LAP (grade 1 diastolic dysfunction).     5 - Normal right ventricular systolic function .     6 - The estimated PA systolic pressure is 21 mmHg.     7 - Mild aortic regurgitation.     8 - Mild tricuspid regurgitation.   CXR concerning for PNA; on Bipap at present   He is febrile from 100.9-102.5, HR 80s-120s, -150s with notation of downward trend at noon of SBP in the 80s   Lactate 4.5 on admission and 3.8 today, procal .38;  BUN 51, Cr 1.1; WBC ct 1.6- on neutropenic precautions   On Cefepime, Cipro, and Vanc for suspected PNA vs endocarditis   
Statement Selected

## 2017-09-08 NOTE — PLAN OF CARE
After discussion with both power of attorneys, Melanie and Jeromy (patient's daughter and son), it has been decided to make Mr. Marvin comfort measures only. At this time, they are waiting until tomorrow to terminally extubate so that the family can be together. All lab work has been discontinued at this time. Most medications have been discontinued besides those that keep the patient comfortable. Family will be in tomorrow.

## 2017-09-08 NOTE — PROGRESS NOTES
Progress Note  Pulmonary & Critical Care Medicine      SUBJECTIVE:     Reason for consult: Sepsis 2/2 Pneumonia    LOS: 2 days    Interval history  Pt's MAPs holding in mid to high 60s overnight on no pressors. However, his MAPs have begun to drop this AM with SBPs in the 60s. Afebrile overnight. Satting 99% on 80% FiO2. Pt intubated after discussions with family yesterday but they still do not want CPR, pressors, or dialysis.     Scheduled Medications   aspirin  81 mg Per OG tube Daily    chlorhexidine  15 mL Mouth/Throat BID    ciprofloxacin  400 mg Intravenous Q12H    clopidogrel  75 mg Per OG tube Daily    cyanocobalamin  500 mcg Per OG tube Daily    famotidine (PF)  20 mg Intravenous BID    fentaNYL  50 mcg Intravenous Once    pantoprazole  40 mg Intravenous Daily    piperacillin-tazobactam 4.5 g in dextrose 5 % 100 mL IVPB (ready to mix system)  4.5 g Intravenous Q8H    tamsulosin  0.4 mg Per NG tube Daily    vancomycin (VANCOCIN) IVPB  15 mg/kg Intravenous Q24H       Medications PRN  acetaminophen, dextrose 50%, glucagon (human recombinant), insulin aspart, ondansetron      OBJECTIVE:     Temp:  [97.7 °F (36.5 °C)-98.5 °F (36.9 °C)]   Pulse:  []   Resp:  [20-38]   BP: ()/(44-78)   SpO2:  [75 %-100 %]     Vitals:    09/08/17 0818   BP:    Pulse: (!) 119   Resp: (!) 26   Temp:          I & O (Last 24H):  I/O last 3 completed shifts:  In: 4391.6 [I.V.:2991.6; IV Piggyback:1400]  Out: 771 [Urine:771]    I/O this shift:  In: 30.1 [I.V.:0.1; NG/GT:30]  Out: -     Physical Exam:   General: appears somewhat agitated on vent   HEENT: AT/NC, PERRL.  Neck: Supple. Trachea midline. ETT in place.   Cardiac: S1S2 auscultated, tachycardic with no MRG  Respiratory: Normal inspection. Symmetric chest rise. Mechanical BSs.  Abdomen: Soft, NT/ND. +BS. No palpable masses. No hepatosplenomegaly.   Extremities: Normal strength and tone (grossly). No visible atrophy. No clubbing or cyanosis on nail exam.    Skin: Warm and dry without visible rash.   Neuro: Grossly intact to brief exam. Oriented with appropriate mood & affect    Laboratory:    Recent Labs  Lab 09/06/17  0139   INR 1.0       Recent Labs  Lab 09/07/17  0155 09/07/17  0728 09/08/17  0335   WBC 16.69* 16.31* 11.43   HGB 10.2* 10.3* 9.5*   HCT 31.1* 31.5* 27.9*    155 126*     Lab Results   Component Value Date    TSH 3.097 09/06/2017     Recent Labs  Lab 09/06/17  0139 09/07/17  0155 09/08/17  0335    139 135*   K 4.5 4.5 4.2    114* 114*   CO2 17* 16* 13*   BUN 51* 42* 50*   CREATININE 1.1 1.0 1.5*   CALCIUM 8.6* 7.4* 7.5*   PROT 7.3 5.9* 4.6*   BILITOT 0.4 0.5 0.5   ALKPHOS 98 60 55   ALT 74* 49* 38   AST 91* 77* 90*       Recent Labs  Lab 09/06/17 0139 09/07/17  0155 09/08/17  0335   MG 2.0 1.5* 1.9     Lab Results   Component Value Date    CALCIUM 7.5 (L) 09/08/2017    PHOS 2.9 09/08/2017     No results found for: HGBA1C      Recent Labs  Lab 09/06/17 0139   TROPONINI 0.010       ABG    Recent Labs  Lab 09/07/17  1351   PH 7.068*   PO2 56*   PCO2 45.7*   HCO3 13.2*   BE -17       Diagnostic Results:  Reviewed    Impression & Recommendations     This is a case of sepsis in the setting of shortness of breath, +blood cultures for GPC in clusters, infiltrate noted on initial chest xray, and previous history of AV endocarditis (staph epi) from July 2016. Patient developed hypoxic respiratory failure was intubated 9/7. Patient is DNR - no chest compressions or pressors. As per family discussion today, patient was trial intubated for support, however, no invasive procedures will be done.     Neuro: Started Fentanyl post-intubation for sedation while on ventilator. Patient is non-verbal and contracted in all four extremities at baseline. Previous CVA - on ASA and Plavix.     Cardio: History of Staph epi AV endocarditis in 07/2016. Cardiology has seen the patient - ANISH will not be possible with patient's critical condition. Empiric  treatment has been started. Patient continues to be tachycardic due to sepsis. Patient has developed shock 2/2 to sepsis - SBP down to 60s. Pressors will not be started as per discussion with family.     Pulm: Patient is on AC ventilation with settings of 330/30/16/80%. Hypoxic respiratory failure - likely due to pulmonary edema 2/2 to (+)7L since admission on top of his diastolic dysfunction. Lasix 40 mg IV administered once yesterday. Decreasing PEEP to 8 this AM due to his hypotension, will go back up on FiO2 to 100%.     GI/FEN: No significant issues at hand. Repleting electrolytes as needed. LFTs elevated bugt downtrending.     Renal: BUN/Cr: 42/1.0 yesterday but now has RANDY with Cr up to 1.5, likely 2/2 sepsis and ATN. UOP is only 25 cc/hr. In light of his pulmonary edema and (+)7L since admission, Lasix 40 mg IV was administered, w/o a great UOP response.      ID: Septic shock 2/2 to PNA and possible endocarditis. WBC improved today. Patient is being treated with Vanc, Zosyn, and Cipro. BC growing CNS and Resp Cxs growing staph aureus. Susceptibilities pending.    Dispo: Will discuss with family but w/ BPs continuing to drop, would likely be best for pt to make him comfort care only.     Pt seen and plan discussed with Dr. Espinosa, staff.     Paul Hobbs MD  [facility]  HO-3  [facility]/Critical Care [facility]

## 2017-09-08 NOTE — CONSULTS
Pulmonary & Critical Care Medicine   Consultation Note    Reason for Consultation: Sepsis 2/2 PNA    HPI: Mr. Marvin is an 83 yo [place] male with a PMH signficant for HTN, CAD, CVA (on ASA and Plavix), DVT, chronic Hep C, previous aortic valve endocarditis (txed with 1 mo of Ceftriaxone 07/2016), and sacral decubitus ulcer (stage II) who presented yesterday, 9/6, from [facility] for failure to thrive. According to patient's son and medical record, patient has not been eating/drinking for the last 3 days and urine output has decreased. Patient's O2 sats dropped at the nursing home and patient became short of breath - after which he was sent to the hospital. According to this son, approximately 6 months ago, patient started to deteriorate. He became nonverbal and is now contracted at baseline.    Within 6 hours of presentation to the hospital, patient was febrile with temp >102 and tachycardic. Patient was given IV boluses =30 cc/kg and Vanc/Cefepime/Cipro. Blood cultures were drawn. Continuous fluids were started. Yesterday morning, patient was placed on BIPAP 10/5 with FiO2 of 80% for hypoxia. At that time, patient's SBP fell into the 80's and additional boluses of NS were administered. WBC 1.6, Lactate was 4.5. Procalcitonin of .38. Patient was also disimpacted.    This morning, patient became severely tachypneic and diaphoretic with rales noted on physical exam. FiO2 was turned up to 100%. After discussion with the family, patient was intubated.    Past Medical History:   Diagnosis Date    Coronary artery disease     DVT (deep venous thrombosis)     Hypertension     Prostate disease     Stroke      History reviewed. No pertinent surgical history.  Social History:   Social History     Social History    Marital status:      Spouse name: N/A    Number of children: N/A    Years of education: N/A     Occupational History    Not on file.     Social History Main Topics    Smoking status:  Never Smoker    Smokeless tobacco: Never Used    Alcohol use No    Drug use: No    Sexual activity: Not on file     Other Topics Concern    Not on file     Social History Narrative    No narrative on file     History reviewed. No pertinent family history.  Drug Allergies:   Review of patient's allergies indicates:  No Known Allergies  Current Infusions:   fentanyl 74.5 mcg/hr (09/07/17 1530)     Scheduled Medications:     [START ON 9/8/2017] aspirin  81 mg Per OG tube Daily    chlorhexidine  15 mL Mouth/Throat BID    ciprofloxacin  400 mg Intravenous Q12H    [START ON 9/8/2017] clopidogrel  75 mg Per OG tube Daily    [START ON 9/8/2017] cyanocobalamin  500 mcg Per OG tube Daily    famotidine (PF)  20 mg Intravenous BID    fentaNYL  50 mcg Intravenous Once    pantoprazole  40 mg Intravenous Daily    phenylephrine        piperacillin-tazobactam 4.5 g in dextrose 5 % 100 mL IVPB (ready to mix system)  4.5 g Intravenous Q8H    [START ON 9/8/2017] tamsulosin  0.4 mg Per NG tube Daily    vancomycin (VANCOCIN) IVPB  15 mg/kg Intravenous Q24H     PRN Medications:   acetaminophen, dextrose 50%, glucagon (human recombinant), insulin aspart, ondansetron    Review of Systems:   A comprehensive 12-point review of systems was performed, and is negative except for those items mentioned above in the HPI section of this note.     Vital Signs:    Vitals:    09/07/17 1931   BP:    Pulse: (!) 116   Resp:    Temp:      Fluid Balance:     Intake/Output Summary (Last 24 hours) at 09/07/17 1945  Last data filed at 09/07/17 1800   Gross per 24 hour   Intake           3494.1 ml   Output              326 ml   Net           3168.1 ml     Physical Exam:   General: Severe distress, on BIPAP 10/5, extremely tachypneic, contracted, somnolent.  HEENT: AT/NC, PERRL.  Neck: Supple. Trachea midline.   Cardiac: S1S2 auscultated, tachycardic with no MRG  Respiratory: Normal inspection. Symmetric chest rise. Rhonchi bilaterally.  Increased work of breathing noted.   Abdomen: Soft, NT/ND. +BS. No palpable masses. No hepatosplenomegaly.   Extremities: Normal strength and tone (grossly). No visible atrophy. No clubbing or cyanosis on nail exam.   Skin: Warm and dry without visible rash.   Neuro: Grossly intact to brief exam. Oriented with appropriate mood & affect.     Personal Review and Summary of Prior Diagnostics    Laboratory Studies:     Recent Labs  Lab 09/07/17  1351   PH 7.068*   PCO2 45.7*   PO2 56*   HCO3 13.2*   POCSATURATED 76*   BE -17       Recent Labs  Lab 09/07/17  0728   WBC 16.31*   RBC 3.45*   HGB 10.3*   HCT 31.5*      MCV 91   MCH 29.9   MCHC 32.7       Recent Labs  Lab 09/07/17  0155      K 4.5   *   CO2 16*   BUN 42*   CREATININE 1.0   MG 1.5*     Microbiology Data:   Microbiology Results (last 7 days)     Procedure Component Value Units Date/Time    Culture, Respiratory with Gram Stain [[id number]]     Order Status:  No result Specimen:  Respiratory from Endotracheal Aspirate     Culture, Respiratory [[id number]] Collected:  09/06/17 0900    Order Status:  Completed Specimen:  Respiratory from Sputum, Expectorated Updated:  09/07/17 1035     Respiratory Culture Further report to follow     Gram Stain (Respiratory) Moderate Gram positive cocci     Gram Stain (Respiratory) ModerateGram positive rods     Gram Stain (Respiratory) Few budding yeast     Gram Stain (Respiratory) No WBC's    Urine culture [[id number]] Collected:  09/06/17 0202    Order Status:  Completed Specimen:  Urine from Urine, Catheterized Updated:  09/07/17 1018     Urine Culture, Routine No growth    Blood culture x two cultures. Draw prior to antibiotics. [[id number]] Collected:  09/06/17 0140    Order Status:  Completed Specimen:  Blood from Peripheral, Forearm, Right Updated:  09/07/17 0954     Blood Culture, Routine Gram stain aer bottle: Gram positive cocci in clusters resembling Staph      Blood Culture, Routine Results called to  and read back by:Briana Ayala RN 09/07/2017  09:53    Narrative:       Aerobic and anaerobic    Blood culture x two cultures. Draw prior to antibiotics. [[id number]] Collected:  09/06/17 0140    Order Status:  Completed Specimen:  Blood from Peripheral, Hand, Right Updated:  09/07/17 0812     Blood Culture, Routine No Growth to date     Blood Culture, Routine No Growth to date    Narrative:       Aerobic and anaerobic    Urine culture [[id number]] Collected:  09/07/17 0158    Order Status:  No result Specimen:  Urine from No method given Updated:  09/07/17 0359    Urine culture [[id number]]     Order Status:  Completed Specimen:  Urine from Urine, Catheterized     Urine culture [[id number]]     Order Status:  Canceled Specimen:  Urine from Urine, Catheterized         Summary of Chest Imaging Personally Reviewed: Bilateral airspace opacities, significant acute worsening since yesterday likely due to pulmonary edema.    2D Echo: TTE 9/7: LVEF 60-65%, basal inferolateral wall hypokinesis. Grade I diastolic dysfunction. Normal RV systolic function. Normal PA pressures. Mild AR and TR.     PFT's: None on file    Impression & Recommendations    This is a case of sepsis in the setting of shortness of breath, +blood cultures for GPC in clusters, infiltrate noted on initial chest xray, and previous history of AV endocarditis (staph epi) from July 2016. Patient developed hypoxic respiratory failure was intubated 9/7. Patient is DNR - no chest compressions or pressors. As per family discussion today, patient was trial intubated for support, however, no invasive procedures will be done.    Neuro: Started Fentanyl post-intubation for sedation while on ventilator. Patient is non-verbal and contracted in all four extremities at baseline. Previous CVA - on ASA and Plavix.    Cardio: History of Staph epi AV endocarditis in 07/2016. Cardiology has seen the patient - ANISH will not be possible with patient's critical condition. Empiric  treatment has been started. Patient continues to be tachycardic due to sepsis. Patient has developed shock 2/2 to sepsis - SBP continues in the 70's-80's. Pressors will not be started as per discussion with family.    Pulm: Patient is on AC ventilation with settings of 330/30/100%/16. Hypoxic respiratory failure - likely due to pulmonary edema 2/2 to (+)7L since admission on top of his diastolic dysfunction. Lasix 40 mg IV administered once today.    GI/FEN: No significant issues at hand. Repleting electrolytes as needed. LFTs elevated on admission - trending downwards.    Renal: BUN/Cr: 42/1.0. Although GFR >60, patient likely has developed oliguric ATN. UOP is only 10-20 cc/hr. In light of his pulmonary edema and (+)6L since admission, Lasix 40 mg IV was administered. Will monitor closely.    ID: Septic shock 2/2 to PNA and possible endocarditis. WBC trending up. Patient is being treated with Vanc, Zosyn, and Cipro. Cultures pending. BC starting to grow GPC in clusters.     Mandie Cao M.D.  [facility]

## 2017-09-08 NOTE — PLAN OF CARE
Problem: Patient Care Overview  Goal: Plan of Care Review  Outcome: Ongoing (interventions implemented as appropriate)  Recommendation/Intervention:   1. If nutritional suppoer desired consider: PPN: Clinimix 4.25/10 at 100ml/hr to provide 1224 kcal, 102g protein, & 2400ml fluid with GIR of 3.8 or TF: Impact Peptide 1.5 at 10ml/hr and advance as tolerated to goal rate of 35ml/hr to provide 1260 kcal, 79g protein, & 724ml free water.    Goals:   Nutritional support will be started within 24 hours  Nutrition Goal Status: new  Communication of RD Recs:  (Kristina)

## 2017-09-08 NOTE — PLAN OF CARE
Problem: Patient Care Overview  Goal: Plan of Care Review  Outcome: Ongoing (interventions implemented as appropriate)  Pt. Received on vent with settings on the flow sheet. Pt. Sleeping in no apparent distress.  Vent check ok, alarms working and are audible. Vent volumes ok.

## 2017-09-08 NOTE — PROGRESS NOTES
Progress Note  [facility]  Admit Date: 9/6/2017   LOS: 2 days   SUBJECTIVE:   Follow-up For: sepsis     Patient seen and examined this AM. Worsening resp failure yesterday, was intubated.  Remains DNR, family requests no pressors.  BP declining and tachycardic.     ROS  Unable to assess  OBJECTIVE:   Vital Signs (Most Recent)  Temp: 98.5 °F (36.9 °C) (09/08/17 0315)  Pulse: (!) 119 (09/08/17 0818)  Resp: (!) 26 (09/08/17 0818)  BP: (!) 92/51 (09/08/17 0745)  SpO2: (!) 94 % (09/08/17 0818)    I & O (Last 24H):  Intake/Output Summary (Last 24 hours) at 09/08/17 0848  Last data filed at 09/08/17 0701   Gross per 24 hour   Intake          2271.74 ml   Output              586 ml   Net          1685.74 ml     Wt Readings from Last 3 Encounters:   09/08/17 43.5 kg (95 lb 14.4 oz)   08/02/17 49 kg (108 lb)   04/07/17 49 kg (108 lb)       Current Diet Order   Procedures    Diet NPO        Physical Exam  GEN: intubated, sedated non-responsive  HEENT: vent tube in place, no tracheal deviation  CV: tachycardic regular rhythm, no JVD  PULM: coarse breath sounds, ventilator sounds  GI: S/NT/ND  EXT: bilateral hand and ankle edema       Laboratory Data:  CBC    Recent Labs  Lab 09/07/17  0155 09/07/17 0728 09/08/17  0335   WBC 16.69* 16.31* 11.43   RBC 3.40* 3.45* 3.06*   HGB 10.2* 10.3* 9.5*   HCT 31.1* 31.5* 27.9*    155 126*   MCV 92 91 91   MCH 30.0 29.9 31.0   MCHC 32.8 32.7 34.1     CMP    Recent Labs  Lab 09/06/17  0139 09/07/17  0155 09/08/17  0335   CALCIUM 8.6* 7.4* 7.5*   PROT 7.3 5.9* 4.6*    139 135*   K 4.5 4.5 4.2   CO2 17* 16* 13*    114* 114*   BUN 51* 42* 50*   CREATININE 1.1 1.0 1.5*   ALKPHOS 98 60 55   ALT 74* 49* 38   AST 91* 77* 90*   BILITOT 0.4 0.5 0.5     POCT-Glucose  POCT Glucose   Date Value Ref Range Status   09/08/2017 147 (H) 70 - 110 mg/dL Final   09/08/2017 62 (L) 70 - 110 mg/dL Final   09/07/2017 85 70 - 110 mg/dL Final   09/07/2017 69 (L) 70 - 110 mg/dL Final    09/07/2017 84 70 - 110 mg/dL Final   09/07/2017 128 (H) 70 - 110 mg/dL Final   09/07/2017 67 (L) 70 - 110 mg/dL Final   09/07/2017 74 70 - 110 mg/dL Final   09/06/2017 99 70 - 110 mg/dL Final   09/06/2017 59 (L) 70 - 110 mg/dL Final   09/06/2017 92 70 - 110 mg/dL Final   09/06/2017 85 70 - 110 mg/dL Final     COA    Recent Labs  Lab 09/06/17  0139   INR 1.0   APTT 27.9     UA  No results for input(s): COLORU, CLARITYU, SPECGRAV, PHUR, PROTEINUA, GLUCOSEU, BLOODU, WBCU, RBCU, BACTERIA, MUCUS in the last 24 hours.    Invalid input(s):  Zeer  MICRO  Microbiology Results (last 7 days)     Procedure Component Value Units Date/Time    Blood culture x two cultures. Draw prior to antibiotics. [[id number]] Collected:  09/06/17 0140    Order Status:  Completed Specimen:  Blood from Peripheral, Hand, Right Updated:  09/08/17 0812     Blood Culture, Routine No Growth to date     Blood Culture, Routine No Growth to date     Blood Culture, Routine No Growth to date    Narrative:       Aerobic and anaerobic    Urine culture [[id number]] Collected:  09/07/17 0158    Order Status:  Completed Specimen:  Urine from No method given Updated:  09/08/17 0648     Urine Culture, Routine No growth    Culture, Respiratory with Gram Stain [[id number]]     Order Status:  No result Specimen:  Respiratory from Endotracheal Aspirate     Culture, Respiratory [[id number]] Collected:  09/06/17 0900    Order Status:  Completed Specimen:  Respiratory from Sputum, Expectorated Updated:  09/07/17 1035     Respiratory Culture Further report to follow     Gram Stain (Respiratory) Moderate Gram positive cocci     Gram Stain (Respiratory) ModerateGram positive rods     Gram Stain (Respiratory) Few budding yeast     Gram Stain (Respiratory) No WBC's    Urine culture [[id number]] Collected:  09/06/17 0202    Order Status:  Completed Specimen:  Urine from Urine, Catheterized Updated:  09/07/17 1018     Urine Culture, Routine No growth    Blood culture x  two cultures. Draw prior to antibiotics. [[id number]] Collected:  09/06/17 0140    Order Status:  Completed Specimen:  Blood from Peripheral, Forearm, Right Updated:  09/07/17 0954     Blood Culture, Routine Gram stain aer bottle: Gram positive cocci in clusters resembling Staph      Blood Culture, Routine Results called to and read back by:Briana Ayala RN 09/07/2017  09:53    Narrative:       Aerobic and anaerobic    Urine culture [[id number]]     Order Status:  Completed Specimen:  Urine from Urine, Catheterized     Urine culture [[id number]]     Order Status:  Canceled Specimen:  Urine from Urine, Catheterized         LIPID PANEL  Lab Results   Component Value Date    CHOL 114 (L) 07/13/2016     Lab Results   Component Value Date    HDL 38 (L) 07/13/2016     Lab Results   Component Value Date    LDLCALC 63.4 07/13/2016     Lab Results   Component Value Date    TRIG 63 07/13/2016     Lab Results   Component Value Date    CHOLHDL 33.3 07/13/2016       Diagnostic Results:  CXR: groundglass opacity concerning for infection.  No pleural fluid    ASSESSMENT/PLAN:     81 yo male with hx of CVA, HTN, BPH, hypothyroidism presented from nursing facility with SOB and FTT and found to have sepsis 2/2 pna worsening resp failure yesterday, now intubated he's a DNR no pressors to start on patient.      Neuro  - wakes to pain  - non verbal, non mobile at baseline   - contractures   - previous CVA: home meds: asa and plavix continued     [facility]  - Hx of HTN : home medications: cozaar 50mg continued  -had hypotension yesterday, gave IV bolus, improved bp now   -BP dropping, and tachycardic  -no pressors per DNR status      Resp  - CXR with worsening pulm infiltrates  - resp failure had to be intubated yesterday   -resp cultures growing yeast  -broad spectrum abx  -prelim blood G+ cocci   -ID on board will likely need fungi coverage  -f/u ABG today  -resp alkalosis not improving      FEN/GI  - Fluids: 30cc/kg per sepsis protocols given in  ED, continued IVF @ 50cc/hr.   - Electrolytes: will continue to monitor and replete PRN   -      Renal  - on admission Bun/cr 50/1.5 worsening  -lasix given yesterday   - baseline from 2016 B/un 11/ cr 1.0  - poor urine output  -     ID  Sepsis 2/2 PNA vs Endocarditis   - WBC 11 today improved from yesterday  - Source PNA seen on CXR vs reoccurrence of endocarditis (treated in July with 1 month iv abx)   - IVF : 30cc/kg given in ED  - IVabx: Vanc and cefepime in ED  Will continue and add Cipro   - lactic acid 3.3 today   -consulted ID due to previous h/o endocarditis      MSK  - sacral decubitus ulcer stage 2  - wound care      Endocrine  -TSH WNL          Code: DNR  Diet: npo  Ppx: pepcid, lovenox  Dispo: yeast growing on resp cultures, blood cultures G+ cocci, worsening renal failure, no pressors since DNR, poor prognosis     9/8/2017 Brian Huggins MD  8:48 AM

## 2017-09-08 NOTE — PROGRESS NOTES
[facility]  Infectious Disease  Progress Note    Patient Name: Simeon Marvin  MRN: [id number]  Admission Date: 9/6/2017  Length of Stay: 2 days  Attending Physician: Rick Siegel MD  Primary Care Provider: Ever Price MD    Isolation Status: No active isolations  Assessment/Plan:      * Sepsis due to pneumonia    -Pt with declining functional status and shortness of breath on presentation; temp 102  -  H/o aortic valve endocarditis 7/2016 with s. Epi + blood cultures; treated with 1 month rocephin and was supposed to have repeat echo, which is not in system; no aortic regur noted at that time  -Now with CXR with diffuse opacities, abd xray with fecal impaction and ileus, TTE with aortic regurg, WBC 1.6->16; persistent elevated lactate (2.8->4.7), procal 0.38->40; initially requiring BiPAP but intubated on 9/7  -Possible source PNA v intraabdominal process v recurrent endocarditis  -Cultures-9/6: Blood: gram stain with G+ cocci resembling staph; NGTD. Respiratory (induced sputum): s. aureus. Urine: NGTD  9/7: Urine: pending; Respiratory (ET tube): pending   -Abx:  -off cefepime (9/6-9/7); off Cipro (9/6-9/8); vanc (9/6-present); zosyn (9/7-present)    -Recs:  --Resp cx with S. Aureus--stopped cipro; continue zosyn and vanc  --f/u repeat respiratory cultures from ET tube and legionella antigen  --consider ANISH vs empirically committing to another course of IV abx for potential recurrent endocarditis; ANISH will likely not be possible 2/2 patient's condition  --will continue to follow cultures and deescalate abx accordingly          Thank you for your consult. I will follow-up with patient. Please contact us if you have any additional questions.    Drew Simmons MD  Infectious Disease  [facility]    Subjective:     Principal Problem:Sepsis due to pneumonia    HPI: 83 yo M with past h/o previous CVA, aortic valve endocarditis (6/17), chronic HCV (presumed untreated), HTN, BPH  who is currently living at [facility]. The patient was sedated so all history was obtained from medical records. He was brought to the hospital for several days of decreased PO intake and shortness of breath. At baseline, he is non-verbal and requires assistance with ADLs.    7/16-episode of aortic endocarditis  -Blood cx, 7/12, 2/2 bottles in 1/1 set with methacillin sensitive S. Epi; 7/13, 2/2 bottles in 1/2 sets with methacillin resistant S. Epi  -Treated with 1 month ceftriaxone    Cultures:  9/6:  Blood: gram stain with G+ cocci resembling staph; NGTD  Respiratory (induced sputum): S. aureus  Urine: NGTD    9/7:  Urine: NGTD  Respiratory (ET tube): pending    Abx:  -off cefepime (9/6-9/7)    -Cipro (9/6-present)  -vanc (9/6-present)  -zosyn (9/7-present)  Interval History: pt remains intubated and sedated    Review of Systems  Objective:     Vital Signs (Most Recent):  Temp: 97.5 °F (36.4 °C) (09/08/17 1129)  Pulse: (!) 120 (09/08/17 1450)  Resp: (!) 41 (09/08/17 1141)  BP: (!) 67/43 (09/08/17 1450)  SpO2: 96 % (09/08/17 1450) Vital Signs (24h Range):  Temp:  [97.5 °F (36.4 °C)-98.5 °F (36.9 °C)] 97.5 °F (36.4 °C)  Pulse:  [] 120  Resp:  [20-41] 41  SpO2:  [80 %-100 %] 96 %  BP: ()/(40-87) 67/43     Weight: 43.5 kg (95 lb 14.4 oz)  Body mass index is 20.04 kg/m².    Estimated Creatinine Clearance: 23.4 mL/min (based on SCr of 1.5 mg/dL (H)).    Physical Exam  Constitutional:   Intubated; frail   HENT:   Head: Normocephalic and atraumatic.   Periorbital edema   Eyes: Conjunctivae are normal. Pupils are equal, round, and reactive to light.   Cardiovascular: Intact distal pulses.    Heart sounds difficult to appreciate over vent   Pulmonary/Chest: Breath sounds normal.   Tachypneic; intubated with diffusely coarse breath sounds  Abdominal: Soft.   Hypoactive bowel sounds   Musculoskeletal:   Contracted upper and lower extremities with increased tone   Neurological:   sedated   Skin:   Stage  2 decubitus ulcer on buttocks and R hip without surrounding erythema or drainage   Significant Labs:   CBC:   Recent Labs  Lab 09/07/17  0155 09/07/17  0728 09/08/17  0335   WBC 16.69* 16.31* 11.43   HGB 10.2* 10.3* 9.5*   HCT 31.1* 31.5* 27.9*    155 126*     Lactic Acid:   Recent Labs  Lab 09/08/17  0335 09/08/17  0836 09/08/17  1225   LACTATE 3.3* 3.8* 2.8*     Microbiology Results (last 7 days)     Procedure Component Value Units Date/Time    Culture, Respiratory [[id number]] Collected:  09/06/17 0900    Order Status:  Completed Specimen:  Respiratory from Sputum, Expectorated Updated:  09/08/17 1012     Respiratory Culture --     STAPHYLOCOCCUS AUREUS  Moderate  Susceptibility pending  Normal respiratory bhavesh also present       Gram Stain (Respiratory) Moderate Gram positive cocci     Gram Stain (Respiratory) ModerateGram positive rods     Gram Stain (Respiratory) Few budding yeast     Gram Stain (Respiratory) No WBC's    Blood culture x two cultures. Draw prior to antibiotics. [[id number]] Collected:  09/06/17 0140    Order Status:  Completed Specimen:  Blood from Peripheral, Forearm, Right Updated:  09/08/17 0936     Blood Culture, Routine Gram stain aer bottle: Gram positive cocci in clusters resembling Staph      Blood Culture, Routine Results called to and read back by:Briana Ayala RN 09/07/2017  09:53     Blood Culture, Routine --     COAGULASE-NEGATIVE STAPHYLOCOCCUS SPECIES  Organism is a probable contaminant      Narrative:       Aerobic and anaerobic    Blood culture x two cultures. Draw prior to antibiotics. [[id number]] Collected:  09/06/17 0140    Order Status:  Completed Specimen:  Blood from Peripheral, Hand, Right Updated:  09/08/17 0812     Blood Culture, Routine No Growth to date     Blood Culture, Routine No Growth to date     Blood Culture, Routine No Growth to date    Narrative:       Aerobic and anaerobic    Urine culture [[id number]] Collected:  09/07/17 0158    Order Status:   Completed Specimen:  Urine from No method given Updated:  09/08/17 0648     Urine Culture, Routine No growth    Culture, Respiratory with Gram Stain [[id number]]     Order Status:  No result Specimen:  Respiratory from Endotracheal Aspirate     Urine culture [[id number]] Collected:  09/06/17 0202    Order Status:  Completed Specimen:  Urine from Urine, Catheterized Updated:  09/07/17 1018     Urine Culture, Routine No growth    Urine culture [[id number]]     Order Status:  Completed Specimen:  Urine from Urine, Catheterized     Urine culture [[id number]]     Order Status:  Canceled Specimen:  Urine from Urine, Catheterized           Significant Imaging: None

## 2017-09-08 NOTE — CONSULTS
"  [facility]  Adult Nutrition  Consult Note    SUMMARY     Recommendations    Recommendation/Intervention:   1. If nutritional suppoer desired consider: PPN: Clinimix 4.25/10 at 100ml/hr to provide 1224 kcal, 102g protein, & 2400ml fluid with GIR of 3.8 or TF: Impact Peptide 1.5 at 10ml/hr and advance as tolerated to goal rate of 35ml/hr to provide 1260 kcal, 79g protein, & 724ml free water.    Goals:   Nutritional support will be started within 24 hours  Nutrition Goal Status: new  Communication of RD Recs:  (Kristina)    Continuum of Care Plan  Referral to Outpatient Services:  (d/c needs to be determined)    Reason for Assessment  Reason for Assessment: nurse/nurse practitioner consult, RD follow-up  Diagnosis: infection/sepsis  Relevent Medical History: prostate disease, CAD, HTN, DVT, stroke      General Information Comments: Pt now intubated on vent. OG tube in place.      Nutrition Prescription Ordered  Current Diet Order: NPO     Evaluation of Received Nutrients/Fluid Intake  % Intake of Estimated Energy Needs: Other: NPO  % Meal Intake: NPO     Nutrition/Diet History  Food Preferences: no Voodoo or cultural food prefs identified  Factors Affecting Nutritional Intake: NPO, on mechanical ventilation     Labs/Tests/Procedures/Meds  Pertinent Labs Reviewed: reviewed, pertinent  Pertinent Labs Comments: Na 135L, BUN 50H, Crea 1.5H, Glu 64L, Ca 7.5L, Alb 1.7L  Pertinent Medications Reviewed: reviewed  Pertinent Medications Comments: aspirin, cipro    Physical Findings  Overall Physical Appearance: on ventilator support  Tubes: orogastric tube  Oral/Mouth Cavity:  (unable to assess)  Skin:  (Scott 9- stg II buttocks/stg Ii R hip)    Anthropometrics  Temp: 98.5 °F (36.9 °C)  Height: 4' 10" (147.3 cm)  Weight Method: Bed Scale  Weight: 43.5 kg (95 lb 14.4 oz)  Ideal Body Weight (IBW), Male: 94 lb  % Ideal Body Weight, Male (lb): 92.64 lb  BMI (Calculated): 18.2  BMI Grade: 17 - 18.4 " protein-energy malnutrition grade I     Estimated/Assessed Needs  Weight Used For Calorie Calculations: 43.5 kg (95 lb 14.4 oz)   Energy Need Method: [place] (1316)  RMR ([place]-St. Jeor Equation): 950.75  Weight Used For Protein Calculations: 43.5 kg (95 lb 14.4 oz)  Protein Requirements: 57-65g (1.3-1.5 g/kg)  Fluid Need Method:  (1ml/kcal)    Assessment and Plan    Failure to thrive in adult    Related to (etiology):   NPO    Signs and Symptoms (as evidenced by):   BMI of 17- intubated on vent    Interventions/Recommendations (treatment strategy):  1. Consider nutritional support: TF of Impact Peptide 1.5 at 35ml/hr or PPN: Clinimix 4.25/10 at 100ml/hr    Nutrition Diagnosis Status:   Continues            Monitor and Evaluation  Food and Nutrient Intake: energy intake  Food and Nutrient Adminstration: diet order, enteral and parenteral nutrition administration  Physical Activity and Function: nutrition-related ADLs and IADLs  Anthropometric Measurements: weight  Biochemical Data, Medical Tests and Procedures: electrolyte and renal panel  Nutrition-Focused Physical Findings: overall appearance    Nutrition Risk  Level of Risk:  (2xweekly)    Nutrition Follow-Up  RD Follow-up?: Yes

## 2017-09-08 NOTE — NURSING
CASE MANAGEMENT NOTE  PATIENT REMAINS IN THE ICU. PATIENT NOW ON THE VENT.  PATIENT IS A RESIDENT OF [facility].   MOHSEN KWAN  [phone]

## 2017-09-08 NOTE — PLAN OF CARE
Problem: Infection, Risk/Actual (Adult)  Goal: Identify Related Risk Factors and Signs and Symptoms  Related risk factors and signs and symptoms are identified upon initiation of Human Response Clinical Practice Guideline (CPG)   Outcome: Ongoing (interventions implemented as appropriate)   09/08/17 0510   Infection, Risk/Actual   Related Risk Factors (Infection, Risk/Actual) age extremes;chronic illness/condition;exposure to microbes;skin integrity impairment   Aseptic technique maintained, afebrile    Problem: Pressure Ulcer Risk (Scott Scale) (Adult,Obstetrics,Pediatric)  Goal: Identify Related Risk Factors and Signs and Symptoms  Related risk factors and signs and symptoms are identified upon initiation of Human Response Clinical Practice Guideline (CPG)   Outcome: Ongoing (interventions implemented as appropriate)   09/08/17 0510   Pressure Ulcer Risk (Scott Scale)   Related Risk Factors (Pressure Ulcer Risk (Scott Scale)) body weight extremes;age extremes;cognitive impairment;critical care admission;infection;mental impairment;mobility impaired;nutritional deficiencies;skeletal deformities   Turned every two hours, positioning wedge utilized, pillows between legs and shoulder

## 2017-09-09 NOTE — PLAN OF CARE
Notified Dr. Dent about the pt heart rate trending up and currently between 190-200. No new orders given. Will continue to monitor.

## 2017-09-09 NOTE — SIGNIFICANT EVENT
Called to see patient for unresponsiveness.  On exam the patient did not respond to verbal or physical stimuli.  Absent heart and breath sounds.  Absent peripheral pulses. Pupils are fixed and dilated. Patient pronounced dead at 5:44 am.  Next of kin/family son notified.  Denied autopsy. Monitor showed asystole.       9/9/2017 8:07 AM Antonina Dent M.D.

## 2017-09-09 NOTE — PLAN OF CARE
Problem: Patient Care Overview  Goal: Plan of Care Review  Outcome: Ongoing (interventions implemented as appropriate)  Patient on  with documented settings.  Alarms are set and functioning with adequate volumes.  AMBU bag and mask at bedside.

## 2017-09-09 NOTE — SIGNIFICANT EVENT
Notified about pt's tachycardia. Went to see the pt he was tachycardic and had agonal breathing. He was still intubated as family had decided to withdraw car in AM. They wanted to be called and notified if anything changes overnight. I called the pt's daughter who has the POA and notified about her father's condition. She said if it is time for him to go let him go naturally. She said she will notify the other family members and will call us back to check on the pt's status.   No medication or intervention done per family's wishes.       9/9/2017 4:39 AM Antonina Dent M.D.

## 2017-09-09 NOTE — PLAN OF CARE
Pt heart rate tachycardic between 190-200. Dr. Dent notified.  Agonal breathing noted. No medications as per family request.     Pt  at 0544. Dr. Dent at bedside. Family notified by Dr. Dent.

## 2017-09-11 LAB
BACTERIA BLD CULT: NORMAL
L PNEUMO AG UR QL IA: NOT DETECTED

## 2017-09-11 NOTE — DISCHARGE SUMMARY
Discharge Summary      Admit Date: 2017    Discharge Date and Time: 2017    Attending Physician: Rick Siegel MD     Discharge Physician: Brian Huggins    Principal Diagnoses: Sepsis due to pneumonia  The primary encounter diagnosis was Pneumonia of right upper lobe due to infectious organism. Diagnoses of Sepsis, due to unspecified organism, Hx of acute bacterial endocarditis, Nonrheumatic aortic valve disorder, Sepsis due to pneumonia, and Urinary tract infection with hematuria, site unspecified were also pertinent to this visit.    Discharged Condition:     Hospital Course: Simeon Marvin is a 82 y.o. male with a past medical history of coronary artery disease, aorta valve endocarditis s/p treatment, hypothyroidism, endocarditis, DVT (deep venous thrombosis), hypertension, prostate disease, and Stroke, who presented with Sepsis due to pneumonia. The following is the hospital course:    Patient brought to ED from nursing home after staff noticed decreased PO intake, decreased urine output, and SOB over the previous few days. Patient non-verbal at baseline, but wakes to painful stimuli on presentation. CXR showed right upper lobe infiltrate concerning for pneumonia. WBC 1.6, Temp 101.9, , and RR 23. Sepsis protocol started in ED. Vancomycin, cefepime, and ciprofloxacin started. Moved to ICU where he required BiPAP. Respiratory cultures showed MRSA. Urine and blood cultures no growth. Discussed end of life care with family, requested DNR but okay with intubation but not pressors. Pulmonology consulted, initiated intubation trial with family's consent. Patient hypotensive to 60's systolic in ICU, no pressors started per family's wishes. Patient with RANDY, but worsening pulmonary edema, lasix given with minimal urine output.  ID consulted, recommended stopping cipro and treating with vanc and zosyn. Worsening respiratory alkalosis, lactic acid 3.3. TSH WNL.Patient on fentanyl drip for comfort.  Patient's heart rate tachycardic to 190-200 and agonal breathing with decreased responsiveness noted on exam. Dr. Dent contacted family with patient's status, family expressed desire for patient to be extubated/removed from vent?  Patient continued to be tachycardic with agonal breathing until he  at 05:44 with Dr. Dent at bedside. Asystole on monitor, family notified per Dr. Dent.       Consults: IP CONSULT TO DIETARY  IP CONSULT TO CARDIOLOGY-[facility]  IP CONSULT TO PULMONOLOGY  IP CONSULT TO INFECTIOUS DISEASES  IP CONSULT TO DIETARY    Significant Diagnostic Studies: See hospital course    Treatments: See hospital course    Disposition:     Patient Instructions:   Discharge Medication List as of 2017  8:35 AM      CONTINUE these medications which have NOT CHANGED    Details   aspirin 81 MG Chew Take 1 tablet (81 mg total) by mouth once daily., Starting 2016, Until Thu 17, No Print      cetirizine (ZYRTEC) 10 MG tablet Take 1 tablet (10 mg total) by mouth 2 (two) times daily., Starting 2016, Until u 17, No Print      clopidogrel (PLAVIX) 75 mg tablet Take 1 tablet (75 mg total) by mouth once daily., Starting 2016, Until u 17, No Print      cyanocobalamin (VITAMIN B-12) 500 MCG tablet Take 500 mcg by mouth once daily., Until Discontinued, Historical Med      levothyroxine (SYNTHROID) 100 mcg injection Inject 12.5 mcg into the vein once daily., Starting 2016, Until u 17, No Print      losartan (COZAAR) 50 MG tablet Take 1 tablet (50 mg total) by mouth once daily., Starting 2016, Until u 17, No Print      potassium phosphate, monobasic, (K-PHOS) 500 mg TbSO Take 1 tablet (500 mg total) by mouth once daily., Starting 2016, Until u 17, No Print      tamsulosin (FLOMAX) 0.4 mg Cp24 Take 0.4 mg by mouth once daily., Until Discontinued, Historical Med      triamcinolone acetonide 0.1% (KENALOG) 0.1 % cream Apply topically 2 (two)  times daily. Apply to affected areas., Starting 7/27/2016, Until Sat 8/6/16, No Print             No discharge procedures on file.    Brian Huggins  09/11/2017  4:47 PM

## 2017-09-12 PROBLEM — E87.20 METABOLIC ACIDOSIS: Status: ACTIVE | Noted: 2017-09-12

## 2017-09-12 PROBLEM — E16.2 HYPOGLYCEMIA: Status: ACTIVE | Noted: 2017-09-12

## 2017-09-12 PROBLEM — N17.0 ACUTE RENAL FAILURE WITH TUBULAR NECROSIS: Status: ACTIVE | Noted: 2017-09-12

## 2017-09-28 NOTE — PHYSICIAN QUERY
PT Name: Simeon Marvin  MR #: [id number]    Physician Query Form - Cause and Effect Relationship Clarification      CDS/: Renata Siegel RN  CCDS               Contact information: [email or web address]    This form is a permanent document in the medical record.     Query Date: September 28, 2017    By submitting this query, we are merely seeking further clarification of documentation. Please utilize your independent clinical judgment when addressing the question(s) below.    The Medical record contains the following:  Supporting Clinical Findings   Location in record    Principal Diagnoses: Sepsis due to pneumonia     Vancomycin, cefepime, and ciprofloxacin started. Moved to ICU where he required BiPAP. Respiratory cultures showed MRSA. Urine and blood cultures no growth.                          Discharge Summary   Methicillin Resistant Staphylococcus aureus - Moderate                                                                                Resp culture 9/6         Provider, please clarify if there is any correlation between MRSA and sepsis:           Are the conditions:     [  ] Due to or associated with each other     [  ] Unrelated to each other     [  ] Other (Please Specify): _________________________     [X  ] Clinically Undetermined    Not a patient I saw

## 2017-09-28 NOTE — PHYSICIAN QUERY
PT Name: Simeon Marvin  MR #: [id number]     Physician Query Form - Documentation Clarification      CDS/: Renata Siegel RN  CCDS               Contact information: [email or web address]    This form is a permanent document in the medical record.     Query Date: September 28, 2017    By submitting this query, we are merely seeking further clarification of documentation. Please utilize your independent clinical judgment when addressing the question(s) below.    The Medical record reflects the following:    Supporting Clinical Findings Location in Medical Record    This is a case of sepsis in the setting of shortness of breath, +blood cultures for GPC in clusters, infiltrate noted on initial chest xray, and previous history of AV endocarditis (staph epi) from July 2016. Patient developed hypoxic respiratory failure was intubated 9/7.  Hypoxic respiratory failure - likely due to pulmonary edema 2/2 to (+)7L since admission on top of his diastolic dysfunction.     83 yo male with hx of CVA, HTN, BPH, hypothyroidism presented from nursing facility with SOB and FTT and found to have sepsis 2/2 pna.  He was noted to be SOB by nursing home staff and EMS was called.    Requiring BiPAP: wean as tolerating     Pulmonology consult 9/12                             H&P    CXR showed right upper lobe infiltrate concerning for pneumonia. WBC 1.6, Temp 101.9, , and RR 23    Intubated with 8.0 ET tube secured at 23cm at the lips  Discharge Summary            Event note 9/7                                                                          Doctor, Please specify diagnosis or diagnoses associated with above clinical findings.  Please clarify the acuity of hypoxemic respiratory failure:    Provider Use Only        [x  ] acute hypoxic respiratory failure      [  ] other respiratory failure acuity (specify): _____________      [  ] clinically undetermined

## 2017-10-03 NOTE — PHYSICIAN QUERY
PT Name: Simeon Marvin  MR #: [id number]    Physician Query Form - Cause and Effect Relationship Clarification      CDS/: KAVYA Phan, RN, CCDS               Contact information: [email or web address]    This form is a permanent document in the medical record.     Query Date: October 3, 2017    By submitting this query, we are merely seeking further clarification of documentation. Please utilize your independent clinical judgment when addressing the question(s) below.    The Medical record contains the following:  Supporting Clinical Findings   Location in record    Principal Diagnoses: Sepsis due to pneumonia     Vancomycin, cefepime, and ciprofloxacin started. Moved to ICU where he required BiPAP. Respiratory cultures showed MRSA. Urine and blood cultures no growth.                          Discharge Summary   Methicillin Resistant Staphylococcus aureus - Moderate                                                                                Resp culture 9/6         Provider, please clarify if there is any correlation between MRSA and sepsis:           Are the conditions:     [x  ] Due to or associated with each other     [  ] Unrelated to each other     [  ] Other (Please Specify): _________________________     [  ] Clinically Undetermined